# Patient Record
Sex: FEMALE | Race: WHITE | NOT HISPANIC OR LATINO | Employment: FULL TIME | URBAN - METROPOLITAN AREA
[De-identification: names, ages, dates, MRNs, and addresses within clinical notes are randomized per-mention and may not be internally consistent; named-entity substitution may affect disease eponyms.]

---

## 2017-01-23 ENCOUNTER — TELEPHONE (OUTPATIENT)
Dept: OBSTETRICS AND GYNECOLOGY | Facility: CLINIC | Age: 27
End: 2017-01-23

## 2017-01-23 NOTE — TELEPHONE ENCOUNTER
----- Message from Kait Acosta sent at 1/23/2017  9:22 AM CST -----  Contact: patient  Patient wants to change her US appt so that her  can be with her, the appointment with Ms Vergara is OK to stay the same. Please call patient back at [phone]. Thank you

## 2017-01-23 NOTE — TELEPHONE ENCOUNTER
----- Message from Makenna Aguilera sent at 1/23/2017  2:57 PM CST -----  Contact: pt  Pt is returning nurse Cindy call. Pls call pt back at [phone]

## 2017-02-02 ENCOUNTER — ROUTINE PRENATAL (OUTPATIENT)
Dept: OBSTETRICS AND GYNECOLOGY | Facility: CLINIC | Age: 27
End: 2017-02-02
Payer: COMMERCIAL

## 2017-02-02 ENCOUNTER — PROCEDURE VISIT (OUTPATIENT)
Dept: OBSTETRICS AND GYNECOLOGY | Facility: CLINIC | Age: 27
End: 2017-02-02
Payer: COMMERCIAL

## 2017-02-02 VITALS
SYSTOLIC BLOOD PRESSURE: 116 MMHG | DIASTOLIC BLOOD PRESSURE: 72 MMHG | BODY MASS INDEX: 31.84 KG/M2 | WEIGHT: 191.38 LBS

## 2017-02-02 DIAGNOSIS — Z36.3 ENCOUNTER FOR ROUTINE SCREENING FOR MALFORMATION USING ULTRASONICS: ICD-10-CM

## 2017-02-02 DIAGNOSIS — Z34.92 ENCOUNTER FOR SUPERVISION OF NORMAL PREGNANCY IN SECOND TRIMESTER, UNSPECIFIED GRAVIDITY: Primary | ICD-10-CM

## 2017-02-02 PROCEDURE — 99999 PR PBB SHADOW E&M-EST. PATIENT-LVL II: CPT | Mod: PBBFAC,,, | Performed by: ADVANCED PRACTICE MIDWIFE

## 2017-02-02 PROCEDURE — 0502F SUBSEQUENT PRENATAL CARE: CPT | Mod: S$GLB,,, | Performed by: ADVANCED PRACTICE MIDWIFE

## 2017-02-02 PROCEDURE — 76805 OB US >/= 14 WKS SNGL FETUS: CPT | Mod: S$GLB,,, | Performed by: ADVANCED PRACTICE MIDWIFE

## 2017-02-02 NOTE — MR AVS SNAPSHOT
[facility]  [address]  [place] [address]  Phone: [phone]  Fax: [phone]                  Liane Valiente   2017 2:45 PM   Routine Prenatal    Description:  Female : 1990   Provider:  Sintia Vergara CNM   Department:  [facility]           Diagnoses this Visit        Comments    Encounter for supervision of normal pregnancy in second trimester, unspecified     -  Primary            To Do List           Future Appointments        Provider Department Dept Phone    2017 2:45 PM JUSTINE Izaguirre OB/ -[phone]    3/2/2017 10:00 AM JUSTINE Izaguirre OB/ KYLE [phone]      Goals (5 Years of Data)     None      [facility]     [facility] On [facility] Nurse Care Line -  Assistance  Registered nurses in the [facility] On Call Center provide clinical advisement, health education, appointment booking, and other advisory services.  Call for this free service at [phone].             Medications           Message regarding Medications     Verify the changes and/or additions to your medication regime listed below are the same as discussed with your clinician today.  If any of these changes or additions are incorrect, please notify your healthcare provider.             Verify that the below list of medications is an accurate representation of the medications you are currently taking.  If none reported, the list may be blank. If incorrect, please contact your healthcare provider. Carry this list with you in case of emergency.           Current Medications     prenatal vitamin #45-iron-FA 28 mg iron- 1 mg Chew Take by mouth.           Clinical Reference Information           Prenatal Vitals     Enc. Date GA Prenatal Vitals Prenatal Pulse Pain Level Urine Albumin/Glucose Edema Presentation Dilation/Effacement/Station    17 20w1d 116/72 / 86.8 kg (191 lb 5.8 oz)  / us 153 / Present  0 Negative / Negative  None      16 13w6d 110/70 / 83 kg (182 lb 15.7 oz)  / 151   Negative / Negative       16 8w5d 110/68 / 82.1 kg (181 lb)  / u/s167   Negative / Negative          TW.7 kg (10 lb 5.8 oz)   Pregravid weight: 82.1 kg (181 lb)       Your Vitals Were     BP Weight Last Period BMI       116/72 86.8 kg (191 lb 5.8 oz) 2016 31.84 kg/m2       Allergies as of 2017     Sulfa (Sulfonamide Antibiotics)    Pear      Immunizations Administered on Date of Encounter - 2017     None      Language Assistance Services     ATTENTION: Language assistance services are available, free of charge. Please call [phone].      ATENCIÓN: Si habla millie, tiene a gill disposición servicios gratuitos de asistencia lingüística. Llame al [phone].     CHÚ Ý: N?u b?n nói Ti?ng Vi?t, có các d?ch v? h? tr? ngôn ng? mi?n phí dành cho b?n. G?i s? [phone].         [facility] complies with applicable Federal civil rights laws and does not discriminate on the basis of race, color, national origin, age, disability, or sex.

## 2017-03-02 ENCOUNTER — ROUTINE PRENATAL (OUTPATIENT)
Dept: OBSTETRICS AND GYNECOLOGY | Facility: CLINIC | Age: 27
End: 2017-03-02
Payer: COMMERCIAL

## 2017-03-02 VITALS
WEIGHT: 200.19 LBS | BODY MASS INDEX: 33.31 KG/M2 | SYSTOLIC BLOOD PRESSURE: 115 MMHG | DIASTOLIC BLOOD PRESSURE: 62 MMHG

## 2017-03-02 DIAGNOSIS — Z34.92 ENCOUNTER FOR SUPERVISION OF NORMAL PREGNANCY IN SECOND TRIMESTER, UNSPECIFIED GRAVIDITY: Primary | ICD-10-CM

## 2017-03-02 PROCEDURE — 0502F SUBSEQUENT PRENATAL CARE: CPT | Mod: S$GLB,,, | Performed by: ADVANCED PRACTICE MIDWIFE

## 2017-03-02 PROCEDURE — 99999 PR PBB SHADOW E&M-EST. PATIENT-LVL II: CPT | Mod: PBBFAC,,, | Performed by: ADVANCED PRACTICE MIDWIFE

## 2017-03-02 NOTE — MR AVS SNAPSHOT
[facility]  [address]  [place] [address]  Phone: [phone]  Fax: [phone]                  Liane Valiente   3/2/2017 10:00 AM   Routine Prenatal    Description:  Female : 1990   Provider:  Sintia Vergara CNM   Department:  [facility]           Reason for Visit     Routine Prenatal Visit                To Do List           Future Appointments        Provider Department Dept Phone    3/20/2017 8:00 AM Sintia Vergara CNM [facility] [phone]      Goals (5 Years of Data)     None      [facility]     [facility] Call Nurse [facility] Line -  Assistance  Registered nurses in the [facility] On Call Center provide clinical advisement, health education, appointment booking, and other advisory services.  Call for this free service at [phone].             Medications           Message regarding Medications     Verify the changes and/or additions to your medication regime listed below are the same as discussed with your clinician today.  If any of these changes or additions are incorrect, please notify your healthcare provider.             Verify that the below list of medications is an accurate representation of the medications you are currently taking.  If none reported, the list may be blank. If incorrect, please contact your healthcare provider. Carry this list with you in case of emergency.           Current Medications     prenatal vitamin #45-iron-FA 28 mg iron- 1 mg Chew Take by mouth.           Clinical Reference Information           Prenatal Vitals     Enc. Date GA Prenatal Vitals Prenatal Pulse Pain Level Urine Albumin/Glucose Edema Presentation Dilation/Effacement/Station    3/2/17 24w1d 115/62 / 90.8 kg (200 lb 2.8 oz)  / 154 / Present  0        17 20w1d 116/72 / 86.8 kg (191 lb 5.8 oz)  / us 153 / Present  0 Negative / Negative None      16 13w6d 110/70 / 83 kg (182 lb 15.7 oz)  / 151   Negative / Negative        16 8w5d 110/68 / 82.1 kg (181 lb)  / u/s167   Negative / Negative          TW.7 kg (19 lb 2.9 oz)   Pregravid weight: 82.1 kg (181 lb)       Your Vitals Were     BP                   115/62           Allergies as of 3/2/2017     Sulfa (Sulfonamide Antibiotics)    Pear      Immunizations Administered on Date of Encounter - 3/2/2017     None      Language Assistance Services     ATTENTION: Language assistance services are available, free of charge. Please call [phone].      ATENCIÓN: Si habla español, tiene a gill disposición servicios gratuitos de asistencia lingüística. Llame al [phone].     CHÚ Ý: N?u b?n nói Ti?ng Vi?t, có các d?ch v? h? tr? ngôn ng? mi?n phí dành cho b?n. G?i s? [phone].         [facility] complies with applicable Federal civil rights laws and does not discriminate on the basis of race, color, national origin, age, disability, or sex.

## 2017-03-02 NOTE — PROGRESS NOTES
Feeling well. 28 week labs next visit. PTL talk.    Coffective counseling sheet Keep Baby Close discussed with mother. Reinforced rooming in practices, continued skin to skin, and quiet hours as requested by mother.  Encouraged mother to download Coffective mobile kaushal if she has not already done so. Mother verbalizes understanding.

## 2017-03-17 ENCOUNTER — PATIENT MESSAGE (OUTPATIENT)
Dept: OBSTETRICS AND GYNECOLOGY | Facility: CLINIC | Age: 27
End: 2017-03-17

## 2017-03-20 ENCOUNTER — LAB VISIT (OUTPATIENT)
Dept: LAB | Facility: HOSPITAL | Age: 27
End: 2017-03-20
Attending: NURSE PRACTITIONER
Payer: COMMERCIAL

## 2017-03-20 ENCOUNTER — ROUTINE PRENATAL (OUTPATIENT)
Dept: OBSTETRICS AND GYNECOLOGY | Facility: CLINIC | Age: 27
End: 2017-03-20
Payer: COMMERCIAL

## 2017-03-20 VITALS
SYSTOLIC BLOOD PRESSURE: 108 MMHG | DIASTOLIC BLOOD PRESSURE: 70 MMHG | WEIGHT: 199.94 LBS | BODY MASS INDEX: 33.27 KG/M2

## 2017-03-20 DIAGNOSIS — Z34.92 ENCOUNTER FOR SUPERVISION OF NORMAL PREGNANCY IN SECOND TRIMESTER, UNSPECIFIED GRAVIDITY: Primary | ICD-10-CM

## 2017-03-20 DIAGNOSIS — Z34.92 ENCOUNTER FOR SUPERVISION OF NORMAL PREGNANCY IN SECOND TRIMESTER, UNSPECIFIED GRAVIDITY: ICD-10-CM

## 2017-03-20 LAB
BASOPHILS # BLD AUTO: 0.02 K/UL
BASOPHILS NFR BLD: 0.2 %
DIFFERENTIAL METHOD: ABNORMAL
EOSINOPHIL # BLD AUTO: 0.1 K/UL
EOSINOPHIL NFR BLD: 0.6 %
ERYTHROCYTE [DISTWIDTH] IN BLOOD BY AUTOMATED COUNT: 12.8 %
GLUCOSE SERPL-MCNC: 105 MG/DL
HCT VFR BLD AUTO: 34.7 %
HGB BLD-MCNC: 12 G/DL
LYMPHOCYTES # BLD AUTO: 1.8 K/UL
LYMPHOCYTES NFR BLD: 20.3 %
MCH RBC QN AUTO: 31.3 PG
MCHC RBC AUTO-ENTMCNC: 34.6 %
MCV RBC AUTO: 90 FL
MONOCYTES # BLD AUTO: 0.6 K/UL
MONOCYTES NFR BLD: 6.1 %
NEUTROPHILS # BLD AUTO: 6.5 K/UL
NEUTROPHILS NFR BLD: 72.4 %
PLATELET # BLD AUTO: 224 K/UL
PMV BLD AUTO: 9.8 FL
RBC # BLD AUTO: 3.84 M/UL
WBC # BLD AUTO: 9 K/UL

## 2017-03-20 PROCEDURE — 99999 PR PBB SHADOW E&M-EST. PATIENT-LVL II: CPT | Mod: PBBFAC,,, | Performed by: ADVANCED PRACTICE MIDWIFE

## 2017-03-20 PROCEDURE — 0502F SUBSEQUENT PRENATAL CARE: CPT | Mod: S$GLB,,, | Performed by: ADVANCED PRACTICE MIDWIFE

## 2017-03-21 ENCOUNTER — PATIENT MESSAGE (OUTPATIENT)
Dept: OBSTETRICS AND GYNECOLOGY | Facility: CLINIC | Age: 27
End: 2017-03-21

## 2017-03-21 LAB
HIV 1+2 AB+HIV1 P24 AG SERPL QL IA: NEGATIVE
RPR SER QL: NORMAL

## 2017-04-11 ENCOUNTER — ROUTINE PRENATAL (OUTPATIENT)
Dept: OBSTETRICS AND GYNECOLOGY | Facility: CLINIC | Age: 27
End: 2017-04-11
Payer: COMMERCIAL

## 2017-04-11 VITALS — BODY MASS INDEX: 34.5 KG/M2 | WEIGHT: 207.31 LBS | SYSTOLIC BLOOD PRESSURE: 110 MMHG | DIASTOLIC BLOOD PRESSURE: 64 MMHG

## 2017-04-11 DIAGNOSIS — Z34.93 ENCOUNTER FOR SUPERVISION OF NORMAL PREGNANCY IN THIRD TRIMESTER, UNSPECIFIED GRAVIDITY: Primary | ICD-10-CM

## 2017-04-11 PROCEDURE — 99999 PR PBB SHADOW E&M-EST. PATIENT-LVL II: CPT | Mod: PBBFAC,,, | Performed by: ADVANCED PRACTICE MIDWIFE

## 2017-04-11 PROCEDURE — 0502F SUBSEQUENT PRENATAL CARE: CPT | Mod: S$GLB,,, | Performed by: ADVANCED PRACTICE MIDWIFE

## 2017-04-11 NOTE — PROGRESS NOTES
Feeling well. Few uc's, nothing regular. PTL talk. bg    Coffective Motivation Document discussed with mother. Reinforced benefits of breastfeeding, risk of formula, and basic principles for skin to skin, rooming in, cue based feedings and importance of effective latch. Encouraged mother to download Coffective mobile kaushal if she has not already done so. Mother verbalizes understanding.      Coffective counseling sheet Nourish discussed with mother. Reinforced basic breastfeeding position and latch as well as proper hand expression technique. Encouraged mother to download Coffective mobile kaushal if she has not already done so.  Mother verbalizes understanding.

## 2017-04-25 ENCOUNTER — ROUTINE PRENATAL (OUTPATIENT)
Dept: OBSTETRICS AND GYNECOLOGY | Facility: CLINIC | Age: 27
End: 2017-04-25
Payer: COMMERCIAL

## 2017-04-25 VITALS
BODY MASS INDEX: 35.04 KG/M2 | WEIGHT: 210.56 LBS | SYSTOLIC BLOOD PRESSURE: 122 MMHG | DIASTOLIC BLOOD PRESSURE: 80 MMHG

## 2017-04-25 DIAGNOSIS — Z34.93 ENCOUNTER FOR SUPERVISION OF NORMAL PREGNANCY IN THIRD TRIMESTER, UNSPECIFIED GRAVIDITY: Primary | ICD-10-CM

## 2017-04-25 PROCEDURE — 0502F SUBSEQUENT PRENATAL CARE: CPT | Mod: S$GLB,,, | Performed by: ADVANCED PRACTICE MIDWIFE

## 2017-04-25 PROCEDURE — 99999 PR PBB SHADOW E&M-EST. PATIENT-LVL II: CPT | Mod: PBBFAC,,, | Performed by: ADVANCED PRACTICE MIDWIFE

## 2017-04-25 NOTE — PROGRESS NOTES
Doing well. Diastolic bp slightly elevated. No PIH complaints. Trace protein in urine, precautions provided. bg    Coffective counseling sheet Nourish discussed with mother. Reinforced basic breastfeeding position and latch as well as proper hand expression technique. Encouraged mother to download Coffective mobile kaushal if she has not already done so.  Mother verbalizes understanding.

## 2017-04-25 NOTE — MR AVS SNAPSHOT
[facility]  [address]  [place] [address]  Phone: [phone]  Fax: [phone]                  Liane Valiente   2017 7:30 AM   Routine Prenatal    Description:  Female : 1990   Provider:  Sintia Vergara CNM   Department:  [facility]           Reason for Visit     Routine Prenatal Visit                To Do List           Future Appointments        Provider Department Dept Phone    2017 10:15 AM JUSTINE Izaguirre [phone]      Goals (5 Years of Data)     None      [facility]     [facility] On Call Nurse Care Line -  Assistance  Unless otherwise directed by your provider, please contact [facility], our nurse care line that is available for  assistance.     Registered nurses in the [facility] Center provide: appointment scheduling, clinical advisement, health education, and other advisory services.  Call: [phone] (toll free)               Medications           Message regarding Medications     Verify the changes and/or additions to your medication regime listed below are the same as discussed with your clinician today.  If any of these changes or additions are incorrect, please notify your healthcare provider.             Verify that the below list of medications is an accurate representation of the medications you are currently taking.  If none reported, the list may be blank. If incorrect, please contact your healthcare provider. Carry this list with you in case of emergency.           Current Medications     prenatal vitamin #45-iron-FA 28 mg iron- 1 mg Chew Take by mouth.           Clinical Reference Information           Prenatal Vitals     Enc. Date GA Prenatal Vitals Prenatal Pulse Pain Level Urine Albumin/Glucose Edema Presentation Dilation/Effacement/Station    17 31w6d 122/80 / 95.5 kg (210 lb 8.6 oz)  / 140 / Present  0        17 29w6d 110/64 / 94 kg (207 lb  5.5 oz)  / 151 / Present  0  None / None / None / No      3/20/17 26w5d 108/70 / 90.7 kg (199 lb 15.3 oz)  / 152 / Present  0 Negative / Negative None      3/2/17 24w1d 115/62 / 90.8 kg (200 lb 2.8 oz)  / 154 / Present  0 Negative / Negative None / None      17 20w1d 116/72 / 86.8 kg (191 lb 5.8 oz)  / us 153 / Present  0 Negative / Negative None      16 13w6d 110/70 / 83 kg (182 lb 15.7 oz)  / 151   Negative / Negative       16 8w5d 110/68 / 82.1 kg (181 lb)  / u/s167   Negative / Negative          TW.4 kg (29 lb 8.7 oz)   Pregravid weight: 82.1 kg (181 lb)       Your Vitals Were     BP Weight Last Period BMI       122/80 95.5 kg (210 lb 8.6 oz) 2016 35.04 kg/m2       Allergies as of 2017     Sulfa (Sulfonamide Antibiotics)    Pear      Immunizations Administered on Date of Encounter - 2017     None      Language Assistance Services     ATTENTION: Language assistance services are available, free of charge. Please call [phone].      ATENCIÓN: Si habla millie, tiene a gill disposición servicios gratuitos de asistencia lingüística. Llame al [phone].     MARISOL Ý: N?u b?n nói Ti?ng Vi?t, có các d?ch v? h? tr? ngôn ng? mi?n phí dành cho b?n. G?i s? [phone].         [facility] complies with applicable Federal civil rights laws and does not discriminate on the basis of race, color, national origin, age, disability, or sex.

## 2017-05-09 ENCOUNTER — ROUTINE PRENATAL (OUTPATIENT)
Dept: OBSTETRICS AND GYNECOLOGY | Facility: CLINIC | Age: 27
End: 2017-05-09
Payer: COMMERCIAL

## 2017-05-09 VITALS — BODY MASS INDEX: 35.6 KG/M2 | DIASTOLIC BLOOD PRESSURE: 74 MMHG | WEIGHT: 213.94 LBS | SYSTOLIC BLOOD PRESSURE: 124 MMHG

## 2017-05-09 DIAGNOSIS — Z34.93 ENCOUNTER FOR SUPERVISION OF NORMAL PREGNANCY IN THIRD TRIMESTER, UNSPECIFIED GRAVIDITY: Primary | ICD-10-CM

## 2017-05-09 PROCEDURE — 99999 PR PBB SHADOW E&M-EST. PATIENT-LVL II: CPT | Mod: PBBFAC,,, | Performed by: ADVANCED PRACTICE MIDWIFE

## 2017-05-09 PROCEDURE — 0502F SUBSEQUENT PRENATAL CARE: CPT | Mod: S$GLB,,, | Performed by: ADVANCED PRACTICE MIDWIFE

## 2017-05-09 NOTE — MR AVS SNAPSHOT
[facility]  [address]  [place] [address]  Phone: [phone]  Fax: [phone]                  Liane Valiente   2017 10:15 AM   Routine Prenatal    Description:  Female : 1990   Provider:  Sintia Vergara CNM   Department:  [facility]           Reason for Visit     Routine Prenatal Visit           Diagnoses this Visit        Comments    Encounter for supervision of normal pregnancy in third trimester, unspecified     -  Primary            To Do List           Future Appointments        Provider Department Dept Phone    2017 7:45 AM Sintia Vergara CNM [facility]/ -[phone]      Goals (5 Years of Data)     None      [facility]     [facility] On Call Nurse Care Line -  Assistance  Unless otherwise directed by your provider, please contact [facility], our nurse care line that is available for  assistance.     Registered nurses in the [facility] Center provide: appointment scheduling, clinical advisement, health education, and other advisory services.  Call: [phone] (toll free)               Medications           Message regarding Medications     Verify the changes and/or additions to your medication regime listed below are the same as discussed with your clinician today.  If any of these changes or additions are incorrect, please notify your healthcare provider.             Verify that the below list of medications is an accurate representation of the medications you are currently taking.  If none reported, the list may be blank. If incorrect, please contact your healthcare provider. Carry this list with you in case of emergency.           Current Medications     prenatal vitamin #45-iron-FA 28 mg iron- 1 mg Chew Take by mouth.           Clinical Reference Information           Prenatal Vitals     Enc. Date GA Prenatal Vitals Prenatal Pulse Pain Level Urine Albumin/Glucose Edema Presentation  Dilation/Effacement/Station    17 33w6d 124/74 / 97 kg (213 lb 15.3 oz)  / 143 / Present  0  None / None / None / No      17 31w6d 122/80 / 95.5 kg (210 lb 8.6 oz)  / 140 / Present  0        17 29w6d 110/64 / 94 kg (207 lb 5.5 oz)  / 151 / Present  0  None / None / None / No      3/20/17 26w5d 108/70 / 90.7 kg (199 lb 15.3 oz)  / 152 / Present  0 Negative / Negative None      3/2/17 24w1d 115/62 / 90.8 kg (200 lb 2.8 oz)  / 154 / Present  0 Negative / Negative None / None      17 20w1d 116/72 / 86.8 kg (191 lb 5.8 oz)  / us 153 / Present  0 Negative / Negative None      16 13w6d 110/70 / 83 kg (182 lb 15.7 oz)  / 151   Negative / Negative       16 8w5d 110/68 / 82.1 kg (181 lb)  / u/s167   Negative / Negative          TW.9 kg (32 lb 15.3 oz)   Pregravid weight: 82.1 kg (181 lb)       Your Vitals Were     BP Weight Last Period BMI       124/74 97 kg (213 lb 15.3 oz) 2016 35.6 kg/m2       Allergies as of 2017     Sulfa (Sulfonamide Antibiotics)    Pear      Immunizations Administered on Date of Encounter - 2017     None      Language Assistance Services     ATTENTION: Language assistance services are available, free of charge. Please call [phone].      ATENCIÓN: Si habla español, tiene a gill disposición servicios gratuitos de asistencia lingüística. Llame al [phone].     MARISOL Ý: N?u b?n nói Ti?ng Vi?t, có các d?ch v? h? tr? ngôn ng? mi?n phí dành cho b?n. G?i s? [phone].         [facility] complies with applicable Federal civil rights laws and does not discriminate on the basis of race, color, national origin, age, disability, or sex.

## 2017-05-09 NOTE — PROGRESS NOTES
Doing well. PTL talk. No complaints. GBS next visit.  bg    Coffective counseling sheet Protect Breastfeeding discussed with mother. Reinforced avoidence of artifical nipples and formula, unless medically indicated.  Encouraged mother to download Coffective mobile kaushal if she has not already done so. Mother verbalizes understanding.

## 2017-05-26 ENCOUNTER — ROUTINE PRENATAL (OUTPATIENT)
Dept: OBSTETRICS AND GYNECOLOGY | Facility: CLINIC | Age: 27
End: 2017-05-26
Payer: COMMERCIAL

## 2017-05-26 VITALS — SYSTOLIC BLOOD PRESSURE: 110 MMHG | DIASTOLIC BLOOD PRESSURE: 64 MMHG | BODY MASS INDEX: 36.61 KG/M2 | WEIGHT: 220 LBS

## 2017-05-26 DIAGNOSIS — Z34.93 ENCOUNTER FOR SUPERVISION OF NORMAL PREGNANCY IN THIRD TRIMESTER, UNSPECIFIED GRAVIDITY: Primary | ICD-10-CM

## 2017-05-26 PROCEDURE — 87081 CULTURE SCREEN ONLY: CPT

## 2017-05-26 PROCEDURE — 0502F SUBSEQUENT PRENATAL CARE: CPT | Mod: S$GLB,,, | Performed by: ADVANCED PRACTICE MIDWIFE

## 2017-05-26 PROCEDURE — 99999 PR PBB SHADOW E&M-EST. PATIENT-LVL II: CPT | Mod: PBBFAC,,, | Performed by: ADVANCED PRACTICE MIDWIFE

## 2017-05-29 LAB — BACTERIA SPEC AEROBE CULT: NORMAL

## 2017-06-02 ENCOUNTER — ROUTINE PRENATAL (OUTPATIENT)
Dept: OBSTETRICS AND GYNECOLOGY | Facility: CLINIC | Age: 27
End: 2017-06-02
Payer: COMMERCIAL

## 2017-06-02 VITALS
WEIGHT: 223.31 LBS | SYSTOLIC BLOOD PRESSURE: 110 MMHG | DIASTOLIC BLOOD PRESSURE: 64 MMHG | BODY MASS INDEX: 37.16 KG/M2

## 2017-06-02 DIAGNOSIS — Z34.93 ENCOUNTER FOR SUPERVISION OF NORMAL PREGNANCY IN THIRD TRIMESTER, UNSPECIFIED GRAVIDITY: Primary | ICD-10-CM

## 2017-06-02 PROCEDURE — 99999 PR PBB SHADOW E&M-EST. PATIENT-LVL II: CPT | Mod: PBBFAC,,, | Performed by: ADVANCED PRACTICE MIDWIFE

## 2017-06-02 PROCEDURE — 0502F SUBSEQUENT PRENATAL CARE: CPT | Mod: S$GLB,,, | Performed by: ADVANCED PRACTICE MIDWIFE

## 2017-06-02 NOTE — PROGRESS NOTES
Doing well. GBS negative. Plans natural birth. Labor talk, kick counts. bg    Coffective counseling sheet Fall In Love discussed with mother. Reinforced immediate skin to skin, the magic first hour, importance of the first feeding and delaying routine procedures. Encouraged mother to download Coffective mobile kaushal if she has not already done so. Mother verbalizes understanding.  Coffective counseling sheet Get Ready discussed with mother. Reinforced avoiding induction of labor unless medically indicated as well as comfort measures during labor.  Encouraged mother to download Coffective mobile kaushal if she has not already done so. Mother verbalizes understanding.

## 2017-06-09 ENCOUNTER — ROUTINE PRENATAL (OUTPATIENT)
Dept: OBSTETRICS AND GYNECOLOGY | Facility: CLINIC | Age: 27
End: 2017-06-09
Payer: COMMERCIAL

## 2017-06-09 VITALS
DIASTOLIC BLOOD PRESSURE: 80 MMHG | BODY MASS INDEX: 37.02 KG/M2 | WEIGHT: 222.44 LBS | SYSTOLIC BLOOD PRESSURE: 128 MMHG

## 2017-06-09 DIAGNOSIS — Z34.93 ENCOUNTER FOR SUPERVISION OF NORMAL PREGNANCY IN THIRD TRIMESTER, UNSPECIFIED GRAVIDITY: Primary | ICD-10-CM

## 2017-06-09 PROCEDURE — 99999 PR PBB SHADOW E&M-EST. PATIENT-LVL II: CPT | Mod: PBBFAC,,, | Performed by: ADVANCED PRACTICE MIDWIFE

## 2017-06-09 PROCEDURE — 99213 OFFICE O/P EST LOW 20 MIN: CPT | Mod: S$GLB,,, | Performed by: ADVANCED PRACTICE MIDWIFE

## 2017-06-12 ENCOUNTER — PATIENT MESSAGE (OUTPATIENT)
Dept: OBSTETRICS AND GYNECOLOGY | Facility: CLINIC | Age: 27
End: 2017-06-12

## 2017-06-13 ENCOUNTER — ROUTINE PRENATAL (OUTPATIENT)
Dept: OBSTETRICS AND GYNECOLOGY | Facility: CLINIC | Age: 27
End: 2017-06-13
Payer: COMMERCIAL

## 2017-06-13 VITALS — SYSTOLIC BLOOD PRESSURE: 114 MMHG | BODY MASS INDEX: 37.2 KG/M2 | DIASTOLIC BLOOD PRESSURE: 72 MMHG | WEIGHT: 223.56 LBS

## 2017-06-13 DIAGNOSIS — Z34.93 ENCOUNTER FOR SUPERVISION OF NORMAL PREGNANCY IN THIRD TRIMESTER, UNSPECIFIED GRAVIDITY: Primary | ICD-10-CM

## 2017-06-13 PROCEDURE — 0502F SUBSEQUENT PRENATAL CARE: CPT | Mod: S$GLB,,, | Performed by: ADVANCED PRACTICE MIDWIFE

## 2017-06-13 PROCEDURE — 99999 PR PBB SHADOW E&M-EST. PATIENT-LVL II: CPT | Mod: PBBFAC,,, | Performed by: ADVANCED PRACTICE MIDWIFE

## 2017-06-20 ENCOUNTER — PROCEDURE VISIT (OUTPATIENT)
Dept: OBSTETRICS AND GYNECOLOGY | Facility: CLINIC | Age: 27
End: 2017-06-20
Payer: COMMERCIAL

## 2017-06-20 ENCOUNTER — ROUTINE PRENATAL (OUTPATIENT)
Dept: OBSTETRICS AND GYNECOLOGY | Facility: CLINIC | Age: 27
End: 2017-06-20
Payer: COMMERCIAL

## 2017-06-20 VITALS
SYSTOLIC BLOOD PRESSURE: 120 MMHG | DIASTOLIC BLOOD PRESSURE: 70 MMHG | BODY MASS INDEX: 37.75 KG/M2 | WEIGHT: 226.88 LBS

## 2017-06-20 DIAGNOSIS — Z3A.39 39 WEEKS GESTATION OF PREGNANCY: ICD-10-CM

## 2017-06-20 DIAGNOSIS — O26.843 UTERINE SIZE-DATE DISCREPANCY, THIRD TRIMESTER: ICD-10-CM

## 2017-06-20 DIAGNOSIS — O26.843 UTERINE SIZE-DATE DISCREPANCY, THIRD TRIMESTER: Primary | ICD-10-CM

## 2017-06-20 DIAGNOSIS — Z34.93 ENCOUNTER FOR SUPERVISION OF NORMAL PREGNANCY IN THIRD TRIMESTER, UNSPECIFIED GRAVIDITY: Primary | ICD-10-CM

## 2017-06-20 PROCEDURE — 99213 OFFICE O/P EST LOW 20 MIN: CPT | Mod: S$GLB,,, | Performed by: ADVANCED PRACTICE MIDWIFE

## 2017-06-20 PROCEDURE — 99999 PR PBB SHADOW E&M-EST. PATIENT-LVL II: CPT | Mod: PBBFAC,,, | Performed by: ADVANCED PRACTICE MIDWIFE

## 2017-06-20 PROCEDURE — 76819 FETAL BIOPHYS PROFIL W/O NST: CPT | Mod: S$GLB,,, | Performed by: OBSTETRICS & GYNECOLOGY

## 2017-06-20 NOTE — PROGRESS NOTES
Doing well. Ready for baby. Size greater than dates. U/s shows good growth. BPP 8/8. 3658 grams. Labor talk, kick counts. bg

## 2017-06-27 ENCOUNTER — ANESTHESIA EVENT (OUTPATIENT)
Dept: OBSTETRICS AND GYNECOLOGY | Facility: HOSPITAL | Age: 27
End: 2017-06-27
Payer: COMMERCIAL

## 2017-06-27 ENCOUNTER — ANESTHESIA (OUTPATIENT)
Dept: OBSTETRICS AND GYNECOLOGY | Facility: HOSPITAL | Age: 27
End: 2017-06-27
Payer: COMMERCIAL

## 2017-06-27 ENCOUNTER — HOSPITAL ENCOUNTER (INPATIENT)
Facility: HOSPITAL | Age: 27
LOS: 2 days | Discharge: HOME | End: 2017-06-29
Attending: OBSTETRICS & GYNECOLOGY | Admitting: OBSTETRICS & GYNECOLOGY
Payer: COMMERCIAL

## 2017-06-27 DIAGNOSIS — Z37.9 NORMAL LABOR: ICD-10-CM

## 2017-06-27 LAB
ABO + RH BLD: NORMAL
BASOPHILS # BLD AUTO: 0.01 K/UL
BASOPHILS NFR BLD: 0.1 %
BLD GP AB SCN CELLS X3 SERPL QL: NORMAL
DIFFERENTIAL METHOD: ABNORMAL
EOSINOPHIL # BLD AUTO: 0 K/UL
EOSINOPHIL NFR BLD: 0.1 %
ERYTHROCYTE [DISTWIDTH] IN BLOOD BY AUTOMATED COUNT: 13.6 %
HCT VFR BLD AUTO: 38.2 %
HGB BLD-MCNC: 13.3 G/DL
LYMPHOCYTES # BLD AUTO: 1.7 K/UL
LYMPHOCYTES NFR BLD: 9.6 %
MCH RBC QN AUTO: 30.8 PG
MCHC RBC AUTO-ENTMCNC: 34.8 %
MCV RBC AUTO: 88 FL
MONOCYTES # BLD AUTO: 0.9 K/UL
MONOCYTES NFR BLD: 5.2 %
NEUTROPHILS # BLD AUTO: 15.1 K/UL
NEUTROPHILS NFR BLD: 85 %
PLATELET # BLD AUTO: 202 K/UL
PMV BLD AUTO: 9.8 FL
RBC # BLD AUTO: 4.32 M/UL
WBC # BLD AUTO: 17.79 K/UL

## 2017-06-27 PROCEDURE — 62326 NJX INTERLAMINAR LMBR/SAC: CPT | Performed by: ANESTHESIOLOGY

## 2017-06-27 PROCEDURE — 25000003 PHARM REV CODE 250: Performed by: ADVANCED PRACTICE MIDWIFE

## 2017-06-27 PROCEDURE — 10907ZC DRAINAGE OF AMNIOTIC FLUID, THERAPEUTIC FROM PRODUCTS OF CONCEPTION, VIA NATURAL OR ARTIFICIAL OPENING: ICD-10-PCS | Performed by: ADVANCED PRACTICE MIDWIFE

## 2017-06-27 PROCEDURE — 85025 COMPLETE CBC W/AUTO DIFF WBC: CPT

## 2017-06-27 PROCEDURE — 11000001 HC ACUTE MED/SURG PRIVATE ROOM

## 2017-06-27 PROCEDURE — 63600175 PHARM REV CODE 636 W HCPCS: Performed by: NURSE ANESTHETIST, CERTIFIED REGISTERED

## 2017-06-27 PROCEDURE — 86850 RBC ANTIBODY SCREEN: CPT

## 2017-06-27 PROCEDURE — 86900 BLOOD TYPING SEROLOGIC ABO: CPT

## 2017-06-27 PROCEDURE — 27800517 HC TRAY,EPIDURAL-CONTINUOUS: Performed by: NURSE ANESTHETIST, CERTIFIED REGISTERED

## 2017-06-27 PROCEDURE — 25000003 PHARM REV CODE 250: Performed by: NURSE ANESTHETIST, CERTIFIED REGISTERED

## 2017-06-27 PROCEDURE — 72100002 HC LABOR CARE, 1ST 8 HOURS

## 2017-06-27 RX ORDER — LIDOCAINE HCL/EPINEPHRINE/PF 2%-1:200K
VIAL (ML) INJECTION
Status: DISCONTINUED | OUTPATIENT
Start: 2017-06-27 | End: 2017-06-27

## 2017-06-27 RX ORDER — LIDOCAINE HYDROCHLORIDE 20 MG/ML
JELLY TOPICAL
Status: DISCONTINUED | OUTPATIENT
Start: 2017-06-27 | End: 2017-06-28

## 2017-06-27 RX ORDER — ROPIVACAINE HYDROCHLORIDE 2 MG/ML
INJECTION, SOLUTION EPIDURAL; INFILTRATION; PERINEURAL
Status: DISCONTINUED | OUTPATIENT
Start: 2017-06-27 | End: 2017-06-27

## 2017-06-27 RX ORDER — OXYTOCIN/RINGER'S LACTATE 20/1000 ML
2 PLASTIC BAG, INJECTION (ML) INTRAVENOUS CONTINUOUS
Status: DISCONTINUED | OUTPATIENT
Start: 2017-06-27 | End: 2017-06-28

## 2017-06-27 RX ORDER — LIDOCAINE HYDROCHLORIDE AND EPINEPHRINE 15; 5 MG/ML; UG/ML
INJECTION, SOLUTION EPIDURAL
Status: DISCONTINUED | OUTPATIENT
Start: 2017-06-27 | End: 2017-06-27

## 2017-06-27 RX ORDER — SODIUM CHLORIDE, SODIUM LACTATE, POTASSIUM CHLORIDE, CALCIUM CHLORIDE 600; 310; 30; 20 MG/100ML; MG/100ML; MG/100ML; MG/100ML
INJECTION, SOLUTION INTRAVENOUS CONTINUOUS
Status: DISCONTINUED | OUTPATIENT
Start: 2017-06-27 | End: 2017-06-28

## 2017-06-27 RX ORDER — ONDANSETRON 8 MG/1
8 TABLET, ORALLY DISINTEGRATING ORAL EVERY 8 HOURS PRN
Status: DISCONTINUED | OUTPATIENT
Start: 2017-06-27 | End: 2017-06-28

## 2017-06-27 RX ORDER — ROPIVACAINE HYDROCHLORIDE 2 MG/ML
INJECTION, SOLUTION EPIDURAL; INFILTRATION; PERINEURAL CONTINUOUS PRN
Status: DISCONTINUED | OUTPATIENT
Start: 2017-06-27 | End: 2017-06-27

## 2017-06-27 RX ORDER — FENTANYL CITRATE 50 UG/ML
INJECTION, SOLUTION INTRAMUSCULAR; INTRAVENOUS
Status: DISCONTINUED | OUTPATIENT
Start: 2017-06-27 | End: 2017-06-27

## 2017-06-27 RX ORDER — MISOPROSTOL 200 UG/1
600 TABLET ORAL
Status: DISCONTINUED | OUTPATIENT
Start: 2017-06-27 | End: 2017-06-28

## 2017-06-27 RX ADMIN — LIDOCAINE HYDROCHLORIDE AND EPINEPHRINE 3 ML: 15; 5 INJECTION, SOLUTION EPIDURAL; INFILTRATION; INTRACAUDAL; PERINEURAL at 03:06

## 2017-06-27 RX ADMIN — SODIUM CHLORIDE, SODIUM LACTATE, POTASSIUM CHLORIDE, AND CALCIUM CHLORIDE: .6; .31; .03; .02 INJECTION, SOLUTION INTRAVENOUS at 07:06

## 2017-06-27 RX ADMIN — LIDOCAINE HYDROCHLORIDE,EPINEPHRINE BITARTRATE 4 ML: 20; .005 INJECTION, SOLUTION EPIDURAL; INFILTRATION; INTRACAUDAL; PERINEURAL at 03:06

## 2017-06-27 RX ADMIN — SODIUM CHLORIDE, SODIUM LACTATE, POTASSIUM CHLORIDE, AND CALCIUM CHLORIDE 1000 ML: .6; .31; .03; .02 INJECTION, SOLUTION INTRAVENOUS at 03:06

## 2017-06-27 RX ADMIN — SODIUM CHLORIDE, SODIUM LACTATE, POTASSIUM CHLORIDE, AND CALCIUM CHLORIDE: .6; .31; .03; .02 INJECTION, SOLUTION INTRAVENOUS at 03:06

## 2017-06-27 RX ADMIN — ROPIVACAINE HYDROCHLORIDE 14 ML/HR: 2 INJECTION, SOLUTION EPIDURAL; INFILTRATION at 03:06

## 2017-06-27 RX ADMIN — SODIUM CHLORIDE, SODIUM LACTATE, POTASSIUM CHLORIDE, AND CALCIUM CHLORIDE 1000 ML: .6; .31; .03; .02 INJECTION, SOLUTION INTRAVENOUS at 11:06

## 2017-06-27 RX ADMIN — FENTANYL CITRATE 100 MCG: 50 INJECTION, SOLUTION INTRAMUSCULAR; INTRAVENOUS at 03:06

## 2017-06-27 RX ADMIN — ROPIVACAINE HYDROCHLORIDE 5 ML: 2 INJECTION, SOLUTION EPIDURAL; INFILTRATION at 03:06

## 2017-06-27 RX ADMIN — ONDANSETRON 8 MG: 8 TABLET, ORALLY DISINTEGRATING ORAL at 07:06

## 2017-06-27 RX ADMIN — Medication 2 MILLI-UNITS/MIN: at 06:06

## 2017-06-27 NOTE — ASSESSMENT & PLAN NOTE
Protracted labor    Cat 2 FHT's   Epidural for pain management per her request  Reassess after comfortable

## 2017-06-27 NOTE — ANESTHESIA PROCEDURE NOTES
Epidural    Patient location during procedure: OB   Reason for block: primary anesthetic   Diagnosis: IUP with labor pain   Start time: 6/27/2017 3:27 PM  Timeout: 6/27/2017 3:25 PM  End time: 6/27/2017 3:38 PM  Staffing  Anesthesiologist: DONAL BOUDREAUX  Resident/CRNA: KYA GUEVARA  Performed: anesthesiologist   Preanesthetic Checklist  Completed: patient identified, pre-op evaluation, timeout performed, IV checked, risks and benefits discussed, monitors and equipment checked, anesthesia consent given, hand hygiene performed and patient being monitored  Preparation  Patient position: sitting  Prep: Betadine  Patient monitoring: Blood Pressure and Pulse Ox  Epidural  Skin Anesthetic: lidocaine 1%  Skin Wheal: 3 mL  Administration type: continuous  Approach: midline  Interspace: L3-4  Injection technique: JANET air and JANET saline  Needle and Epidural Catheter  Needle type: Tuohy   Needle gauge: 18  Needle length: 3.5 inches  Needle insertion depth: 6 cm  Catheter type: multi-orifice  Catheter size: 20 G  Catheter at skin depth: 15 cm  Test dose: 3 mL of lidocaine 1.5% with Epi 1-to-200,000  Additional Documentation: incremental injection, no signs/symptoms of IV or SA injection, negative aspiration for heme and CSF, no paresthesia on injection, no significant pain on injection and no significant complaints from patient  Needle localization: anatomical landmarks  Medications:  Bolus administered: 10 mL of 0.2% ropivacaine  Epinephrine added: none  Assessment  Upper dermatomal levels - Left: T8  Right: T8   Dermatomal levels determined by pinch or prick  Ease of block: easy  Patient's tolerance of the procedure: comfortable throughout block and no complaints

## 2017-06-27 NOTE — PROGRESS NOTES
[facility] -   Obstetrics  Labor Progress Note    Patient Name: Liane Valiente  MRN: [id number]  Admission Date: 2017  Hospital Length of Stay: 0 days  Attending Physician: Dede Powell MD  Primary Care Provider: Mandi England MD    Subjective:     Principal Problem:Normal labor    Hospital Course:  Admit for spontaneous active labor  NCB    Interval History:  Liane is a 26 y.o.  at 40w6d. She is exhausted. Desires Nitrous oxide for pain management     Objective:     Vital Signs (Most Recent):  Temp: 98 °F (36.7 °C) (17 1521)  Pulse: 88 (17 1405)  Resp: 18 (17 0900)  BP: (!) 136/92 (17 1402)  SpO2: 100 % (17 1405) Vital Signs (24h Range):  Temp:  [98 °F (36.7 °C)-98.2 °F (36.8 °C)] 98 °F (36.7 °C)  Pulse:  [71-99] 88  Resp:  [17-18] 18  SpO2:  [99 %-100 %] 100 %  BP: (107-140)/(56-92) 136/92     Weight: 99.8 kg (220 lb)  Body mass index is 37.76 kg/m².    FHT: 135 bpm; mod variability Cat 1 (reassuring)  TOCO:  Q 4-6 minutes    Cervical Exam:  Deferred      Significant Labs:  Lab Results   Component Value Date    GROUPTRH A POS 2017    HEPBSAG Negative 2016    STREPBCULT No Group B Streptococcus isolated 2017       I have personallly reviewed all pertinent lab results from the last 24 hours.    Physical Exam:   Constitutional: She is oriented to person, place, and time. She appears well-developed and well-nourished.    HENT:   Head: Normocephalic.      Cardiovascular: Intact distal pulses.   Pulse Score: 2+   Pulmonary/Chest: Effort normal. No respiratory distress.        Abdominal: Soft.     Genitourinary: Uterus normal.           Musculoskeletal: Moves all extremeties.       Neurological: She is alert and oriented to person, place, and time.    Skin: Skin is warm and dry.    Psychiatric: She has a normal mood and affect. Her behavior is normal.       Assessment/Plan:     26 y.o. female  at 40w6d for:    * Normal labor     Active labor   Nitrous oxide for pain management   Anticipate vaginal birth               Kaitlyn Lorenzana CNM  Obstetrics  [facility]

## 2017-06-27 NOTE — SUBJECTIVE & OBJECTIVE
Interval History:  Liane is a 26 y.o.  at 40w6d. She has used nitrous oxide and birth tub for pain management without much relief; requests epidural    Objective:     Vital Signs (Most Recent):  Temp: 98 °F (36.7 °C) (17 1521)  Pulse: 88 (17 1405)  Resp: 18 (17 0900)  BP: (!) 136/92 (17 1402)  SpO2: 100 % (17 1405) Vital Signs (24h Range):  Temp:  [98 °F (36.7 °C)-98.2 °F (36.8 °C)] 98 °F (36.7 °C)  Pulse:  [71-99] 88  Resp:  [17-18] 18  SpO2:  [99 %-100 %] 100 %  BP: (107-140)/(56-92) 136/92     Weight: 99.8 kg (220 lb)  Body mass index is 37.76 kg/m².    FHT: 135 bpm;mod variability; occasional variable decel Cat 2 (reassuring)  TOCO:  Q 2-4 minutes    Cervical Exam:  Dilation:  8  Effacement:  100%  Station: -2  Presentation: Vertex   AROM @ 1345, clear fluid      Significant Labs:  Lab Results   Component Value Date    GROUPTRH A POS 2017    HEPBSAG Negative 2016    STREPBCULT No Group B Streptococcus isolated 2017       I have personallly reviewed all pertinent lab results from the last 24 hours.    Physical Exam:   Constitutional: She is oriented to person, place, and time. She appears well-developed and well-nourished.        Pulmonary/Chest: Effort normal. No respiratory distress.          Genitourinary: Vagina normal and uterus normal.           Musculoskeletal: Moves all extremeties.       Neurological: She is alert and oriented to person, place, and time.    Skin: Skin is warm and dry.    Psychiatric: She has a normal mood and affect.

## 2017-06-27 NOTE — ASSESSMENT & PLAN NOTE
IUPC placed, no cervical change X 12 hours, cervix more edematous than earlier today and molding noted.  Start low dose pitocin per protocol

## 2017-06-27 NOTE — PROGRESS NOTES
[facility]  Obstetrics  Labor Progress Note    Patient Name: Liane Valiente  MRN: [id number]  Admission Date: 2017  Hospital Length of Stay: 0 days  Attending Physician: Dede Powell MD  Primary Care Provider: Mandi England MD    Subjective:     Principal Problem:Normal labor    Hospital Course:  Admit for spontaneous active labor  NCB    Interval History:  Liane is a 26 y.o.  at 40w6d. She is doing well, comfortable after epidural.     Objective:     Vital Signs (Most Recent):  Temp: 98 °F (36.7 °C) (17 1521)  Pulse: 88 (17 1405)  Resp: 18 (17 0900)  BP: (!) 136/92 (17 1402)  SpO2: 100 % (17 1405) Vital Signs (24h Range):  Temp:  [98 °F (36.7 °C)-98.2 °F (36.8 °C)] 98 °F (36.7 °C)  Pulse:  [71-99] 88  Resp:  [17-18] 18  SpO2:  [99 %-100 %] 100 %  BP: (107-140)/(56-92) 136/92     Weight: 99.8 kg (220 lb)  Body mass index is 37.76 kg/m².    FHT: 130 Cat 2 (reassuring)  TOCO: Q 2-3 minutes    Cervical Exam:  Dilation:  8  Effacement:  80 %  Station: -2  Presentation: Vertex     Significant Labs:  Lab Results   Component Value Date    GROUPTRH A POS 2017    HEPBSAG Negative 2016    STREPBCULT No Group B Streptococcus isolated 2017       I have personallly reviewed all pertinent lab results from the last 24 hours.    Physical Exam:   Constitutional: No distress.               Genitourinary: Vagina normal.                     Assessment/Plan:     26 y.o. female  at 40w6d for:    Labor abnormal    IUPC placed, no cervical change X 12 hours, cervix more edematous than earlier today and molding noted.  Start low dose pitocin per protocol        * Normal labor    Protracted labor                 Kaitlyn Lorenzana CNM  Obstetrics  [facility]

## 2017-06-27 NOTE — SUBJECTIVE & OBJECTIVE
Interval History:  Liane is a 26 y.o.  at 40w6d. She is doing ok, but having much more pain than she has had with her other children, considering AROM or epidural.    Objective:     Vital Signs (Most Recent):  Temp: 98.2 °F (36.8 °C) (17 0900)  Pulse: 96 (17 0900)  Resp: 18 (17 0900)  BP: 127/76 (17 0900) Vital Signs (24h Range):  Temp:  [98.2 °F (36.8 °C)] 98.2 °F (36.8 °C)  Pulse:  [96-99] 96  Resp:  [17-18] 18  BP: (127-140)/(76-86) 127/76     Weight: 99.8 kg (220 lb)  Body mass index is 37.76 kg/m².    FHT: 140 reassuring)  TOCO:  Q 2 minutes    Cervical Exam:  Dilation:  9  Effacement:  75%  Station: -1  Presentation: Vertex     Significant Labs:  Lab Results   Component Value Date    GROUPTRH A POS 2016    HEPBSAG Negative 2016    STREPBCULT No Group B Streptococcus isolated 2017       I have personallly reviewed all pertinent lab results from the last 24 hours.    Physical Exam:   Constitutional: She is oriented to person, place, and time. She appears well-developed and well-nourished.       Cardiovascular: Normal rate and regular rhythm.     Pulmonary/Chest: Effort normal and breath sounds normal.        Abdominal: Soft. Bowel sounds are normal.   gravid             Musculoskeletal: Normal range of motion and moves all extremeties.       Neurological: She is alert and oriented to person, place, and time.     Psychiatric: She has a normal mood and affect. Her behavior is normal.

## 2017-06-27 NOTE — PROGRESS NOTES
[facility] -   Obstetrics  Labor Progress Note    Patient Name: Liane Valiente  MRN: [id number]  Admission Date: 2017  Hospital Length of Stay: 0 days  Attending Physician: Dede Powell MD  Primary Care Provider: Mandi England MD    Subjective:     Principal Problem:Normal labor    Hospital Course:  Admit for spontaneous active labor  NCB    Interval History:  Liane is a 26 y.o.  at 40w6d. She is doing ok, but having much more pain than she has had with her other children, considering AROM or epidural.    Objective:     Vital Signs (Most Recent):  Temp: 98.2 °F (36.8 °C) (17 0900)  Pulse: 96 (17 0900)  Resp: 18 (17 0900)  BP: 127/76 (17 0900) Vital Signs (24h Range):  Temp:  [98.2 °F (36.8 °C)] 98.2 °F (36.8 °C)  Pulse:  [96-99] 96  Resp:  [17-18] 18  BP: (127-140)/(76-86) 127/76     Weight: 99.8 kg (220 lb)  Body mass index is 37.76 kg/m².    FHT: 140 reassuring)  TOCO:  Q 2 minutes    Cervical Exam:  Dilation:  9  Effacement:  75%  Station: -1  Presentation: Vertex     Significant Labs:  Lab Results   Component Value Date    GROUPTRH A POS 2016    HEPBSAG Negative 2016    STREPBCULT No Group B Streptococcus isolated 2017       I have personallly reviewed all pertinent lab results from the last 24 hours.    Physical Exam:   Constitutional: She is oriented to person, place, and time. She appears well-developed and well-nourished.       Cardiovascular: Normal rate and regular rhythm.     Pulmonary/Chest: Effort normal and breath sounds normal.        Abdominal: Soft. Bowel sounds are normal.   gravid             Musculoskeletal: Normal range of motion and moves all extremeties.       Neurological: She is alert and oriented to person, place, and time.     Psychiatric: She has a normal mood and affect. Her behavior is normal.       Assessment/Plan:     26 y.o. female  at 40w6d for:  Pt and her  considering AROM or epidural.  Discussed options with her, pt undecided, will check on her again in 20-30 minutes.    * Normal labor    Admit for management of spontaneous active labor   Desires NCB -- birth tub for pain management   Anticipate vaginal birth               Kaitlyn Lorenzana CNM  Obstetrics  [facility]

## 2017-06-27 NOTE — ASSESSMENT & PLAN NOTE
Admit for management of spontaneous active labor   Desires NCB -- birth tub for pain management   Anticipate vaginal birth

## 2017-06-27 NOTE — PROGRESS NOTES
[facility]  Obstetrics  Labor Progress Note    Patient Name: Liane Valiente  MRN: [id number]  Admission Date: 2017  Hospital Length of Stay: 0 days  Attending Physician: Dede Powell MD  Primary Care Provider: Mandi England MD    Subjective:     Principal Problem:Normal labor    Hospital Course:  Admit for spontaneous active labor  NCB    Interval History:  Liane is a 26 y.o.  at 40w6d. She has used nitrous oxide and birth tub for pain management without much relief; requests epidural    Objective:     Vital Signs (Most Recent):  Temp: 98 °F (36.7 °C) (17 1521)  Pulse: 88 (17 1405)  Resp: 18 (17 0900)  BP: (!) 136/92 (17 1402)  SpO2: 100 % (17 1405) Vital Signs (24h Range):  Temp:  [98 °F (36.7 °C)-98.2 °F (36.8 °C)] 98 °F (36.7 °C)  Pulse:  [71-99] 88  Resp:  [17-18] 18  SpO2:  [99 %-100 %] 100 %  BP: (107-140)/(56-92) 136/92     Weight: 99.8 kg (220 lb)  Body mass index is 37.76 kg/m².    FHT: 135 bpm;mod variability; occasional variable decel Cat 2 (reassuring)  TOCO:  Q 2-4 minutes    Cervical Exam:  Dilation:  8  Effacement:  100%  Station: -2  Presentation: Vertex   AROM @ 1345, clear fluid      Significant Labs:  Lab Results   Component Value Date    GROUPTRH A POS 2017    HEPBSAG Negative 2016    STREPBCULT No Group B Streptococcus isolated 2017       I have personallly reviewed all pertinent lab results from the last 24 hours.    Physical Exam:   Constitutional: She is oriented to person, place, and time. She appears well-developed and well-nourished.        Pulmonary/Chest: Effort normal. No respiratory distress.          Genitourinary: Vagina normal and uterus normal.           Musculoskeletal: Moves all extremeties.       Neurological: She is alert and oriented to person, place, and time.    Skin: Skin is warm and dry.    Psychiatric: She has a normal mood and affect.       Assessment/Plan:     26 y.o. female   at 40w6d for:    * Normal labor    Protracted labor    Cat 2 FHT's   Epidural for pain management per her request  Reassess after comfortable                 Kaitlyn Lorenzana CNM  Obstetrics  [facility]

## 2017-06-27 NOTE — ANESTHESIA PREPROCEDURE EVALUATION
06/27/2017  Liane Valiente is a 26 y.o., female.    Pre-op Assessment    I have reviewed the Patient Summary Reports.     I have reviewed the Nursing Notes.   I have reviewed the Medications.     Review of Systems  Anesthesia Hx:  No previous Anesthesia  Neg history of prior surgery. Denies Family Hx of Anesthesia complications.   Denies Personal Hx of Anesthesia complications.   Social:  Non-Smoker, No Alcohol Use    Hematology/Oncology:  Hematology Normal   Oncology Normal     EENT/Dental:EENT/Dental Normal   Cardiovascular:  Cardiovascular Normal     Pulmonary:  Pulmonary Normal    Renal/:  Renal/ Normal     Hepatic/GI:  Hepatic/GI Normal    Musculoskeletal:  Musculoskeletal Normal    Neurological:  Neurology Normal    Endocrine:  Endocrine Normal    Psych:  Psychiatric Normal           Physical Exam  General:  Well nourished    Airway/Jaw/Neck:  Airway Findings: Mouth Opening: Normal Tongue: Normal  General Airway Assessment: Adult  Mallampati: II  Improves to II with phonation.  TM Distance: Normal, at least 6 cm       Chest/Lungs:  Chest/Lungs Findings: Normal Respiratory Rate     Heart/Vascular:  Heart Findings: Rate: Normal        Mental Status:  Mental Status Findings:  Cooperative, Alert and Oriented         Anesthesia Plan  Type of Anesthesia, risks & benefits discussed:  Anesthesia Type:  epidural  Patient's Preference:   Intra-op Monitoring Plan: standard ASA monitors  Intra-op Monitoring Plan Comments:   Post Op Pain Control Plan:   Post Op Pain Control Plan Comments:   Induction:    Beta Blocker:  Patient is not currently on a Beta-Blocker (No further documentation required).       Informed Consent: Patient understands risks and agrees with Anesthesia plan.  Questions answered. Anesthesia consent signed with patient.  ASA Score: 2     Day of Surgery Review of History & Physical: I  have interviewed and examined the patient. I have reviewed the patient's H&P dated:        Anesthesia Plan Notes: OB consult for Nitrous Oxide administration.

## 2017-06-27 NOTE — NURSING
Nitrous oxide set up at this time by [facility] nurse anesthetist. Patient lying in  Bed, siderails up,  at bedside with primary nurse. Continuous pulse ox and VS regulated by primary nurse.

## 2017-06-27 NOTE — SUBJECTIVE & OBJECTIVE
Obstetric HPI:  Patient reports Date/time of onset: 17 0400, Frequency: Every 2-3 minutes, Duration: 60 seconds and Intensity: moderate contractions, active fetal movement, No vaginal bleeding , possible of loss of fluid. Hx of 2 previous NCB.     This pregnancy has been uncomplicated.    Obstetric History       T2      L2     SAB0   TAB0   Ectopic0   Multiple0   Live Births2       # Outcome Date GA Lbr Robert/2nd Weight Sex Delivery Anes PTL Lv   3 Current            2 Term 01/19/15 38w2d  3.35 kg (7 lb 6.2 oz) M Vag-Spont  N NATHANIEL      Complications: Nuchal cord      Apgar1:  6                Apgar5: 8   1 Term 13 39w6d  3.147 kg (6 lb 15 oz) F Vag-Spont   NATHANIEL      Name: Keegan        No past medical history on file.  No past surgical history on file.    PTA Medications   Medication Sig    prenatal vitamin #45-iron-FA 28 mg iron- 1 mg Chew Take by mouth.       Review of patient's allergies indicates:   Allergen Reactions    Pear Anaphylaxis and Swelling    Sulfa (sulfonamide antibiotics) Anaphylaxis     Can't breathe, rash  Can't breathe, rash        Family History     Problem Relation (Age of Onset)    ADD / ADHD Other    Heart disease Other    Lung cancer Maternal Grandfather    Pancreatic cancer Paternal Grandfather        Social History Main Topics    Smoking status: Never Smoker    Smokeless tobacco: Not on file    Alcohol use No      Comment: occasional    Drug use: No    Sexual activity: Yes     Partners: Male     Review of Systems   Respiratory: Negative.    Cardiovascular: Negative.    Gastrointestinal: Negative.    Genitourinary: Positive for vaginal discharge.   Neurological: Negative.       Objective:     Vital Signs (Most Recent):    Vital Signs (24h Range):           There is no height or weight on file to calculate BMI.    FHT: 135  bpmCat 1 (reassuring)  TOCO:  Q 2-3 minutes    Physical Exam:   Constitutional: She is oriented to person, place, and time. She appears  well-developed and well-nourished.       Cardiovascular: Normal rate and regular rhythm.   Pulse Score: 2+   Pulmonary/Chest: Effort normal and breath sounds normal. No respiratory distress.        Abdominal: Soft. Bowel sounds are normal.   gravid     Genitourinary: Vagina normal and uterus normal.           Musculoskeletal: Normal range of motion and moves all extremeties.       Neurological: She is alert and oriented to person, place, and time.    Skin: Skin is warm and dry.    Psychiatric: She has a normal mood and affect. Her behavior is normal.       Cervix:  Dilation:  7  Effacement:  100%  Station: -2  Presentation: Vertex   Mucous noted in vagina on speculum exam      Significant Labs:  Lab Results   Component Value Date    GROUPTRH A POS 11/07/2016    HEPBSAG Negative 11/07/2016    STREPBCULT No Group B Streptococcus isolated 05/26/2017       I have personallly reviewed all pertinent lab results from the last 24 hours.

## 2017-06-27 NOTE — H&P
[facility] -   Obstetrics  History & Physical    Patient Name: Liane Valiente  MRN: [id number]  Admission Date: 2017  Primary Care Provider: Mandi England MD    Subjective:     Principal Problem:Normal labor    History of Present Illness:  C/o ctx and possible LOF this morning     Obstetric HPI:  Patient reports Date/time of onset: 17 0400, Frequency: Every 2-3 minutes, Duration: 60 seconds and Intensity: moderate contractions, active fetal movement, No vaginal bleeding , possible of loss of fluid. Hx of 2 previous NCB.     This pregnancy has been uncomplicated.    Obstetric History       T2      L2     SAB0   TAB0   Ectopic0   Multiple0   Live Births2       # Outcome Date GA Lbr Robert/2nd Weight Sex Delivery Anes PTL Lv   3 Current            2 Term 01/19/15 38w2d  3.35 kg (7 lb 6.2 oz) M Vag-Spont  N NATHANIEL      Complications: Nuchal cord      Apgar1:  6                Apgar5: 8   1 Term 13 39w6d  3.147 kg (6 lb 15 oz) F Vag-Spont   NATHANIEL      Name: Keegan        No past medical history on file.  No past surgical history on file.    PTA Medications   Medication Sig    prenatal vitamin #45-iron-FA 28 mg iron- 1 mg Chew Take by mouth.       Review of patient's allergies indicates:   Allergen Reactions    Pear Anaphylaxis and Swelling    Sulfa (sulfonamide antibiotics) Anaphylaxis     Can't breathe, rash  Can't breathe, rash        Family History     Problem Relation (Age of Onset)    ADD / ADHD Other    Heart disease Other    Lung cancer Maternal Grandfather    Pancreatic cancer Paternal Grandfather        Social History Main Topics    Smoking status: Never Smoker    Smokeless tobacco: Not on file    Alcohol use No      Comment: occasional    Drug use: No    Sexual activity: Yes     Partners: Male     Review of Systems   Respiratory: Negative.    Cardiovascular: Negative.    Gastrointestinal: Negative.    Genitourinary: Positive for vaginal discharge.    Neurological: Negative.       Objective:     Vital Signs (Most Recent):    Vital Signs (24h Range):           There is no height or weight on file to calculate BMI.    FHT: 135  bpmCat 1 (reassuring)  TOCO:  Q 2-3 minutes\    Physical Exam:   Constitutional: She is oriented to person, place, and time. She appears well-developed and well-nourished.       Cardiovascular: Normal rate and regular rhythm.   Pulse Score: 2+   Pulmonary/Chest: Effort normal and breath sounds normal. No respiratory distress.        Abdominal: Soft. Bowel sounds are normal.   gravid     Genitourinary: Vagina normal and uterus normal.           Musculoskeletal: Normal range of motion and moves all extremeties.       Neurological: She is alert and oriented to person, place, and time.    Skin: Skin is warm and dry.    Psychiatric: She has a normal mood and affect. Her behavior is normal.       Cervix:  Dilation:  7  Effacement:  100%  Station: -2  Presentation: Vertex   Soft   Mucous noted in vagina on speculum exam      Significant Labs:  Lab Results   Component Value Date    GROUPTRH A POS 2016    HEPBSAG Negative 2016    STREPBCULT No Group B Streptococcus isolated 2017       I have personallly reviewed all pertinent lab results from the last 24 hours.    Assessment/Plan:     26 y.o. female  at 40w6d for:    * Normal labor    Admit for management of spontaneous active labor   Desires NCB -- birth tub for pain management   Anticipate vaginal birth             Kaitlyn Lorenzana CNM  Obstetrics  [facility]

## 2017-06-28 PROCEDURE — 25000003 PHARM REV CODE 250: Performed by: ADVANCED PRACTICE MIDWIFE

## 2017-06-28 PROCEDURE — 72100003 HC LABOR CARE, EA. ADDL. 8 HRS

## 2017-06-28 PROCEDURE — 72200005 HC VAGINAL DELIVERY LEVEL II

## 2017-06-28 PROCEDURE — 51701 INSERT BLADDER CATHETER: CPT

## 2017-06-28 PROCEDURE — 11000001 HC ACUTE MED/SURG PRIVATE ROOM

## 2017-06-28 RX ORDER — PRENATAL WITH FERROUS FUM AND FOLIC ACID 3080; 920; 120; 400; 22; 1.84; 3; 20; 10; 1; 12; 200; 27; 25; 2 [IU]/1; [IU]/1; MG/1; [IU]/1; MG/1; MG/1; MG/1; MG/1; MG/1; MG/1; UG/1; MG/1; MG/1; MG/1; MG/1
1 TABLET ORAL DAILY
Status: DISCONTINUED | OUTPATIENT
Start: 2017-06-28 | End: 2017-06-29 | Stop reason: HOSPADM

## 2017-06-28 RX ORDER — ACETAMINOPHEN 325 MG/1
650 TABLET ORAL EVERY 6 HOURS PRN
Status: DISCONTINUED | OUTPATIENT
Start: 2017-06-28 | End: 2017-06-29 | Stop reason: HOSPADM

## 2017-06-28 RX ORDER — DIPHENHYDRAMINE HYDROCHLORIDE 50 MG/ML
25 INJECTION INTRAMUSCULAR; INTRAVENOUS EVERY 4 HOURS PRN
Status: DISCONTINUED | OUTPATIENT
Start: 2017-06-28 | End: 2017-06-29 | Stop reason: HOSPADM

## 2017-06-28 RX ORDER — OXYTOCIN/RINGER'S LACTATE 20/1000 ML
41.65 PLASTIC BAG, INJECTION (ML) INTRAVENOUS CONTINUOUS
Status: ACTIVE | OUTPATIENT
Start: 2017-06-28 | End: 2017-06-28

## 2017-06-28 RX ORDER — OXYCODONE AND ACETAMINOPHEN 10; 325 MG/1; MG/1
1 TABLET ORAL EVERY 4 HOURS PRN
Status: DISCONTINUED | OUTPATIENT
Start: 2017-06-28 | End: 2017-06-29 | Stop reason: HOSPADM

## 2017-06-28 RX ORDER — OXYCODONE AND ACETAMINOPHEN 5; 325 MG/1; MG/1
1 TABLET ORAL EVERY 4 HOURS PRN
Status: DISCONTINUED | OUTPATIENT
Start: 2017-06-28 | End: 2017-06-29 | Stop reason: HOSPADM

## 2017-06-28 RX ORDER — IBUPROFEN 600 MG/1
600 TABLET ORAL EVERY 6 HOURS
Status: DISCONTINUED | OUTPATIENT
Start: 2017-06-28 | End: 2017-06-29 | Stop reason: HOSPADM

## 2017-06-28 RX ORDER — HYDROCORTISONE 25 MG/G
CREAM TOPICAL 3 TIMES DAILY PRN
Status: DISCONTINUED | OUTPATIENT
Start: 2017-06-28 | End: 2017-06-29 | Stop reason: HOSPADM

## 2017-06-28 RX ORDER — ONDANSETRON 8 MG/1
8 TABLET, ORALLY DISINTEGRATING ORAL EVERY 8 HOURS PRN
Status: DISCONTINUED | OUTPATIENT
Start: 2017-06-28 | End: 2017-06-29 | Stop reason: HOSPADM

## 2017-06-28 RX ORDER — SIMETHICONE 80 MG
1 TABLET,CHEWABLE ORAL EVERY 6 HOURS PRN
Status: DISCONTINUED | OUTPATIENT
Start: 2017-06-28 | End: 2017-06-29 | Stop reason: HOSPADM

## 2017-06-28 RX ORDER — DOCUSATE SODIUM 100 MG/1
100 CAPSULE, LIQUID FILLED ORAL DAILY PRN
Status: DISCONTINUED | OUTPATIENT
Start: 2017-06-28 | End: 2017-06-29 | Stop reason: HOSPADM

## 2017-06-28 RX ADMIN — IBUPROFEN 600 MG: 600 TABLET, FILM COATED ORAL at 11:06

## 2017-06-28 RX ADMIN — IBUPROFEN 600 MG: 600 TABLET, FILM COATED ORAL at 05:06

## 2017-06-28 RX ADMIN — IBUPROFEN 600 MG: 600 TABLET, FILM COATED ORAL at 12:06

## 2017-06-28 RX ADMIN — OXYCODONE HYDROCHLORIDE AND ACETAMINOPHEN 1 TABLET: 5; 325 TABLET ORAL at 04:06

## 2017-06-28 RX ADMIN — PRENATAL WITH FERROUS FUM AND FOLIC ACID 1 EACH: 3080; 920; 120; 400; 22; 1.84; 3; 20; 10; 1; 12; 200; 27; 25; 2 TABLET ORAL at 09:06

## 2017-06-28 NOTE — PROGRESS NOTES
S: RN called and reported late decelerations, pitocin off  O:  VSS   FHTs: Cat 2, late decelerations   IUPC: every 2-3 min,    SVE: deferred  A:  IUP @ 4w6d   Second stage  P: Allow FHTs to recover and start pushing

## 2017-06-28 NOTE — SUBJECTIVE & OBJECTIVE
Hospital course: Admit for spontaneous active labor  NCB  Normal PP course    Patient is doing well this morning. Ambulating and voiding without difficulty. Lochia is small without clots. Mild abdominal cramping relieved with Motrin. Patient is breastfeeding. Using NFP for contraception. She desires circumcision for her male baby: not applicable.    Objective:     Vital Signs (Most Recent):  Temp: 97.9 °F (36.6 °C) (06/28/17 0810)  Pulse: 77 (06/28/17 0810)  Resp: 18 (06/28/17 0810)  BP: 116/67 (06/28/17 0810)  SpO2: 98 % (06/27/17 2239) Vital Signs (24h Range):  Temp:  [97.9 °F (36.6 °C)-99.9 °F (37.7 °C)] 97.9 °F (36.6 °C)  Pulse:  [] 77  Resp:  [18] 18  SpO2:  [88 %-100 %] 98 %  BP: ()/() 116/67     Weight: 99.8 kg (220 lb)  Body mass index is 37.76 kg/m².      Intake/Output Summary (Last 24 hours) at 06/28/17 0945  Last data filed at 06/28/17 0400   Gross per 24 hour   Intake                0 ml   Output             2050 ml   Net            -2050 ml       Significant Labs:  Lab Results   Component Value Date    GROUPTRH A POS 06/27/2017    HEPBSAG Negative 11/07/2016    STREPBCULT No Group B Streptococcus isolated 05/26/2017       Recent Labs  Lab 06/27/17  1138   HGB 13.3   HCT 38.2       I have personallly reviewed all pertinent lab results from the last 24 hours.    Physical Exam:   Constitutional: She is oriented to person, place, and time. She appears well-developed and well-nourished. No distress.        Pulmonary/Chest: Effort normal.        Abdominal: Soft. There is no tenderness.   Fundus firm at U-1                 Neurological: She is alert and oriented to person, place, and time.     Psychiatric: She has a normal mood and affect.

## 2017-06-28 NOTE — LACTATION NOTE
Lactation Rounds: Mother confident with breastfeeding. Lactation packet given and admit information reviewed. Mother verbalizes understanding of expected  behaviors and output for the first 48 hours of life.  Discussed the importance of cue based feedings on demand, unrestricted access to the breast, and frequent uninterrupted skin to skin contact.  Risk and implications of artificial nipples and supplementation discussed.  Encouraged mother to call for assistance when desired or when infant is showing signs of hunger, contact number provided, mother verbalizes understanding.

## 2017-06-28 NOTE — ANESTHESIA RELEASE NOTE
"Anesthesia Release from PACU Note    Patient: Liane Valiente    Procedure(s) Performed: * No procedures listed *    Anesthesia type: epidural    Post pain: Adequate analgesia    Post assessment: no apparent anesthetic complications and tolerated procedure well    Last Vitals:   Visit Vitals  BP (!) 118/42   Pulse 90   Temp 37 °C (98.6 °F) (Axillary)   Resp 18   Ht 5' 4" (1.626 m)   Wt 99.8 kg (220 lb)   LMP 09/14/2016   SpO2 100%   Breastfeeding? No   BMI 37.76 kg/m²       Post vital signs: stable    Level of consciousness: awake, alert  and oriented    Nausea/Vomiting: no nausea/no vomiting    Complications: none    Airway Patency: patent    Respiratory: unassisted, spontaneous ventilation, room air    Cardiovascular: stable and blood pressure at baseline    Hydration: euvolemic  "

## 2017-06-28 NOTE — PROGRESS NOTES
S: Pt comfortable with epidural  O:  VSS   FHTs: Cat 2, 140, intermittent variables   IUPC: q 3 minutes,  Pit @ 12 mu/min   SVE: 10/100/-2  A:  IUP @ 40w6d   Protracted labor   Second stage  P: Epidural heavy, anesthesia turned down pump to aid in pushing.   Labor down X 1 hour and then begin pushing unless patient has spontaneous desire to  push.

## 2017-06-28 NOTE — PLAN OF CARE
Problem: Patient Care Overview  Goal: Plan of Care Review  Outcome: Ongoing (interventions implemented as appropriate)  Pt pain controlled well with ibprofen.  Ambulated without difficulty. Fundus firm bleeding moderate.

## 2017-06-28 NOTE — TRANSFER OF CARE
"Anesthesia Transfer of Care Note    Patient: Liane Valiente    Procedure(s) Performed: * No procedures listed *    Patient location: Labor and Delivery    Anesthesia Type: epidural    Post pain: adequate analgesia    Post assessment: no apparent anesthetic complications    Post vital signs: stable    Level of consciousness: awake, alert and oriented    Nausea/Vomiting: no nausea/vomiting    Complications: none    Transfer of care protocol not completed      Last vitals:   Visit Vitals  BP (!) 118/42   Pulse 90   Temp 37 °C (98.6 °F) (Axillary)   Resp 18   Ht 5' 4" (1.626 m)   Wt 99.8 kg (220 lb)   LMP 09/14/2016   SpO2 100%   Breastfeeding? No   BMI 37.76 kg/m²     "

## 2017-06-28 NOTE — ANESTHESIA POSTPROCEDURE EVALUATION
"Anesthesia Post Evaluation    Patient: Liane Valiente    Procedure(s) Performed: * No procedures listed *    Final Anesthesia Type: epidural  Patient location during evaluation: labor & delivery  Patient participation: Yes- Able to Participate  Level of consciousness: awake and alert and oriented  Post-procedure vital signs: reviewed and stable  Pain management: adequate  Airway patency: patent  PONV status at discharge: No PONV  Anesthetic complications: no      Cardiovascular status: blood pressure returned to baseline  Respiratory status: unassisted, spontaneous ventilation and room air  Hydration status: euvolemic  Follow-up not needed.        Visit Vitals  BP (!) 118/42   Pulse 90   Temp 37 °C (98.6 °F) (Axillary)   Resp 18   Ht 5' 4" (1.626 m)   Wt 99.8 kg (220 lb)   LMP 09/14/2016   SpO2 100%   Breastfeeding? No   BMI 37.76 kg/m²       Pain/Antony Score: No Data Recorded      "

## 2017-06-29 VITALS
TEMPERATURE: 98 F | BODY MASS INDEX: 37.56 KG/M2 | SYSTOLIC BLOOD PRESSURE: 124 MMHG | RESPIRATION RATE: 18 BRPM | HEART RATE: 89 BPM | OXYGEN SATURATION: 98 % | DIASTOLIC BLOOD PRESSURE: 81 MMHG | WEIGHT: 220 LBS | HEIGHT: 64 IN

## 2017-06-29 PROCEDURE — 25000003 PHARM REV CODE 250: Performed by: ADVANCED PRACTICE MIDWIFE

## 2017-06-29 RX ADMIN — PRENATAL WITH FERROUS FUM AND FOLIC ACID 1 EACH: 3080; 920; 120; 400; 22; 1.84; 3; 20; 10; 1; 12; 200; 27; 25; 2 TABLET ORAL at 08:06

## 2017-06-29 RX ADMIN — OXYCODONE HYDROCHLORIDE AND ACETAMINOPHEN 1 TABLET: 5; 325 TABLET ORAL at 05:06

## 2017-06-29 RX ADMIN — IBUPROFEN 600 MG: 600 TABLET, FILM COATED ORAL at 05:06

## 2017-06-29 RX ADMIN — IBUPROFEN 600 MG: 600 TABLET, FILM COATED ORAL at 11:06

## 2017-06-29 NOTE — DISCHARGE SUMMARY
[facility] -   Obstetrics  Discharge Summary      Patient Name: Liane Valiente  MRN: [id number]  Admission Date: 2017  Hospital Length of Stay: 2 days  Discharge Date and Time:  2017 11:50 AM  Attending Physician: Dede Powell MD   Discharging Provider: Laura Thompson CNM  Primary Care Provider: Mandi England MD    HPI: C/o ctx and possible LOF this morning     * No surgery found *     Hospital Course:   Admit for spontaneous active labor  NCB  Normal PP course  Ready for discharge        Final Active Diagnoses:    Diagnosis Date Noted POA    PRINCIPAL PROBLEM:   (normal spontaneous vaginal delivery) [O80] 2017 Not Applicable    Single liveborn [Z38.2] 2017 Unknown      Problems Resolved During this Admission:    Diagnosis Date Noted Date Resolved POA    Normal labor [O80, Z37.9] 2017 Not Applicable    Labor abnormal [O62.9] 2017 Unknown        Labs: CBC No results for input(s): WBC, HGB, HCT, PLT in the last 48 hours.    Feeding Method: breast    Immunizations     Date Immunization Status Dose Route/Site Given by    17 0128 MMR Incomplete 0.5 mL Subcutaneous/Left deltoid           Delivery:    Episiotomy: None   Lacerations: None   Repair suture: None   Repair # of packets:     Blood loss (ml): 150     Birth information:  YOB: 2019   Time of birth: 10:38 PM   Sex: female   Delivery type: Vaginal, Spontaneous Delivery   Gestational Age: 40w6d    Delivery Clinician:      Other providers:       Additional  information:  Forceps:    Vacuum:    Breech:    Observed anomalies      Living?:           APGARS  One minute Five minutes Ten minutes   Skin color:         Heart rate:         Grimace:         Muscle tone:         Breathing:         Totals: 4  9  9      Placenta: Delivered:       appearance    Pending Diagnostic Studies:     None          Discharged Condition: good    Disposition: Home or Self  Care    Follow Up:  Follow-up Information     Follow up In 4 weeks.               Patient Instructions:     Diet general     Activity as tolerated       Medications:  Current Discharge Medication List      CONTINUE these medications which have NOT CHANGED    Details   prenatal vitamin #45-iron-FA 28 mg iron- 1 mg Chew Take by mouth.             Laura Thompson CNM  Obstetrics  [facility]

## 2017-06-29 NOTE — SUBJECTIVE & OBJECTIVE
Hospital course: Admit for spontaneous active labor  NCB  Normal PP course  Ready for discharge    Patient is breastfeeding. Using IUD for contraception. She desires circumcision for her male baby: not applicable.    Objective:     Vital Signs (Most Recent):  Temp: 97.4 °F (36.3 °C) (06/29/17 0800)  Pulse: 97 (06/29/17 0800)  Resp: 18 (06/29/17 0800)  BP: 125/82 (06/29/17 0800)  SpO2: 98 % (06/27/17 2239) Vital Signs (24h Range):  Temp:  [96.7 °F (35.9 °C)-98.2 °F (36.8 °C)] 97.4 °F (36.3 °C)  Pulse:  [63-97] 97  Resp:  [15-19] 18  BP: (112-125)/(63-82) 125/82     Weight: 99.8 kg (220 lb)  Body mass index is 37.76 kg/m².    No intake or output data in the 24 hours ending 06/29/17 1147    Significant Labs:  Lab Results   Component Value Date    GROUPTRH A POS 06/27/2017    HEPBSAG Negative 11/07/2016    STREPBCULT No Group B Streptococcus isolated 05/26/2017     No results for input(s): HGB, HCT in the last 48 hours.    I have personallly reviewed all pertinent lab results from the last 24 hours.    Physical Exam:   Constitutional: She is oriented to person, place, and time. She appears well-developed and well-nourished.     Eyes: Conjunctivae are normal. Pupils are equal, round, and reactive to light.    Neck: Normal range of motion.    Cardiovascular: Normal rate and regular rhythm.     Pulmonary/Chest: Breath sounds normal.        Abdominal: Soft.     Genitourinary: Vagina normal and uterus normal.           Musculoskeletal: Normal range of motion and moves all extremeties.       Neurological: She is alert and oriented to person, place, and time.    Skin: Skin is warm.    Psychiatric: She has a normal mood and affect. Her behavior is normal. Thought content normal.

## 2017-06-29 NOTE — PLAN OF CARE
Problem: Patient Care Overview  Goal: Plan of Care Review  Outcome: Ongoing (interventions implemented as appropriate)  Patient is stable. Patient has stable VS. Patient is ambulating with no assistance. t.

## 2017-06-29 NOTE — PLAN OF CARE
Discharge instructions given to patient.  Verbalized understanding.  D/c'ed per w/c with infant on lap.  Essentially no change in initial assessment.

## 2017-06-29 NOTE — DISCHARGE INSTRUCTIONS
"Mother Self Care:    Activity: Avoid strenuous exercise and get adequate rest.  No driving until the physician consent given.  Emotional Changes: Most women find birth to be a time of great emotional upheaval.  Sense of loss, mood swings, fatigue, anxiety, and feeling "let down" are common.  If feelings worsen or last more than a week, call your physician.  Breast Care/Breastfeeding: Wear a bra for comfort.  Keep nipples dry and apply your own breast milk or lanolin cream as needed for soreness.  Engorgement can be relieved with warm, moist heat before feedings.  You may also take Ibuprofen.  Breast Care/Bottle Feeding: Wear support bra 24 hours a day for one week.  Avoid stimulation to breasts.  You may use ice packs for discomfort.  Ernie-Care/Vaginal Bleeding: Remember to use your ernie-bottle after urinating.  Your flow will change from red, to pink, to yellow/white color over a period of 2 weeks.  Menstruation will return in 3-8 weeks, or longer if breastfeeding.  Episiotomy Vaginal Delivery: Stitches will dissolve within 10 days to 3 weeks.  Warm baths, tucks, and dermoplast will promote healing.  Avoid bubble baths or strong soaps.   Section/Tubal Ligation: Keep incision clean and dry.  Please remove steri-strips in 5-7 days.  You may shower, but avoid baths.  Sexual Activity/Pelvic Rest: No sexual activity, tampons, or douching until your physician gives you consent.  Diet: Continue to eat from the five basic food groups, including plenty of protein, fruits, vegetables, and whole grains.  Limit empty calories and high fat foods.  Drink enough fluids to satisfy thirst and add an extra 500 calories for breastfeeding.  Constipation/Hemorrhoids: Drink plenty of water.  You may take a stool softener or natural laxative (Metamucil). You may use tucks or hemorrhoid ointment and soak in a warm tub.    CALL YOUR OB DOCTOR IF ANY OF THE FOLLOWING OCCURS:  *Heavy bleeding - saturating a pad an hour or passing any " large (2-3 inches in size) blood clots.  *Any pain, redness, or tenderness in lower leg.  *You cannot care for yourself or your baby.  *Any signs of infection-      - Temperature greater than 100.5 degrees F      - Foul smelling vaginal discharge and/or incisional drainage      - Increased episiotomy or incisional pain      - Hot, hard, red or sore area on breast      - Flu-like symptoms      - Any urgency, frequency or burning with urination

## 2017-06-29 NOTE — PROGRESS NOTES
[facility] -   Obstetrics  Postpartum Progress Note    Patient Name: Liane Valiente  MRN: [id number]  Admission Date: 2017  Hospital Length of Stay: 2 days  Attending Physician: Dede Powell MD  Primary Care Provider: Mandi England MD    Subjective:     Principal Problem: (normal spontaneous vaginal delivery)    Hospital course: Admit for spontaneous active labor  NCB  Normal PP course  Ready for discharge    Patient is breastfeeding. Using IUD for contraception. She desires circumcision for her male baby: not applicable.    Objective:     Vital Signs (Most Recent):  Temp: 97.4 °F (36.3 °C) (17 0800)  Pulse: 97 (17 0800)  Resp: 18 (17 0800)  BP: 125/82 (17 0800)  SpO2: 98 % (17 2239) Vital Signs (24h Range):  Temp:  [96.7 °F (35.9 °C)-98.2 °F (36.8 °C)] 97.4 °F (36.3 °C)  Pulse:  [63-97] 97  Resp:  [15-19] 18  BP: (112-125)/(63-82) 125/82     Weight: 99.8 kg (220 lb)  Body mass index is 37.76 kg/m².    No intake or output data in the 24 hours ending 17 1147    Significant Labs:  Lab Results   Component Value Date    GROUPTRH A POS 2017    HEPBSAG Negative 2016    STREPBCULT No Group B Streptococcus isolated 2017     No results for input(s): HGB, HCT in the last 48 hours.    I have personallly reviewed all pertinent lab results from the last 24 hours.    Physical Exam:   Constitutional: She is oriented to person, place, and time. She appears well-developed and well-nourished.     Eyes: Conjunctivae are normal. Pupils are equal, round, and reactive to light.    Neck: Normal range of motion.    Cardiovascular: Normal rate and regular rhythm.     Pulmonary/Chest: Breath sounds normal.        Abdominal: Soft.     Genitourinary: Vagina normal and uterus normal.           Musculoskeletal: Normal range of motion and moves all extremeties.       Neurological: She is alert and oriented to person, place, and time.    Skin: Skin is warm.     Psychiatric: She has a normal mood and affect. Her behavior is normal. Thought content normal.       Assessment/Plan:     26 y.o. female  for:    *  (normal spontaneous vaginal delivery)    Normal pp course  Desires IUD pp  P discharge home   Follow up 4 weeks            Disposition: As patient meets milestones, will plan to discharge today    Laura Thompson CNM  Obstetrics  [facility]

## 2017-06-29 NOTE — PLAN OF CARE
Problem: Patient Care Overview  Goal: Plan of Care Review  Outcome: Ongoing (interventions implemented as appropriate)  Pt progressing well. Vss. Pain controlled with scheduled motrin and 1 dose of percocet 5. States she wants to stay away from the percocet as much as possible, but at that time the pain was making her very uncomfortable. Bleeding light to scant, voids frequently, fundus firm without massage. Bonding well with infant, named her. Breastfeeding well, is very confident due to breastfeeding other 2 children along with tandem nursing.

## 2017-06-29 NOTE — L&D DELIVERY NOTE
Delivery Information for  Millie Valiente    Birth information:  YOB: 2019   Time of birth: 10:38 PM   Sex: female   Head Delivery Date/Time: 2017 10:37 PM   Delivery type: Vaginal, Spontaneous Delivery   Gestational Age: 40w6d    Delivery Providers    Delivering clinician:  Kaitlyn Lorenzana CNM   Other personnel:   Provider Role   Kem Rosales RN Registered Nurse   Latrice Jernigan Surgical Tech   Crys Del Rio RN Registered Nurse   Susu Vann, RN Registered Nurse                Measurements    Weight:  3785 g Length:  53 cm   Head circum.:  36 cm Chest circum.:  34.5 cm   Abdominal girth:  35 cm          Assessment    Living status:  Living  Apgars:     1 Minute:   5 Minute:   10 Minute:   15 Minute:   20 Minute:     Skin Color:   0  1  1      Heart Rate:   2  2  2      Reflex Irritability:   1  2  2      Muscle Tone:   0  2  2      Respiratory Effort:   1  2  2      Total:   4  9  9              Apgars Assigned By:  MICHELET LORENZANA CNM         Assisted Delivery Details:    Forceps attempted?:  No  Vacuum extractor attempted?:  No         Shoulder Dystocia    Shoulder dystocia present?:  No           Presentation and Position    Presentation:   Vertex   Position:   Right    Occiput    Posterior            Interventions/Resuscitation    Method:  Bulb Suctioning, Blow By Oxygen, Tactile Stimulation, Deep Suctioning       Cord    Vessels:  3 vessels  Complications:  Nuchal  Nuchal Intervention:  reduced  Nuchal Cord Description:  tight nuchal cord  Number of Loops:  1  Delayed Cord Clamping?:  No  Cord Clamped Date/Time:  2017 10:38 PM  Cord Blood Disposition:  Lab  Gases Sent?:  No  Stem Cell Collection (by MD):  No       Placenta    Date and time:  2017 10:46 PM  Removal:  Spontaneous  Appearance:  Intact  Placenta disposition:  family           Labor Events:       labor: No     Labor Onset Date/Time:         Dilation Complete Date/Time: 2017  19:53     Start Pushing Date/Time: 2017 21:45     Rupture Date/Time:              Rupture type:           Fluid Amount:        Fluid Color:        Fluid Odor:        Membrane Status (PeriCalm): ARM (Artificial Rupture)      Rupture Date/Time (PeriCalm): 2017 13:45:00      Fluid Amount (PeriCalm): Moderate      Fluid Color (PeriCalm): Clear       steroids: None     Antibiotics given for GBS: No     Induction: none     Indications for induction:        Augmentation:       Indications for augmentation:       Labor complications: None     Additional complications:          Cervical ripening:                     Delivery:      Episiotomy: None     Indication for Episiotomy:       Perineal Lacerations: None Repaired:      Periurethral Laceration: none Repaired:     Labial Laceration: none Repaired:     Sulcus Laceration: none Repaired:     Vaginal Laceration: No Repaired:     Cervical Laceration: No Repaired:     Repair suture: None     Repair # of packets:       Vaginal delivery QBL (mL): 150      QBL (mL): 0     Combined Blood Loss (mL): 150     Vaginal Sweep Performed:       Surgicount Correct:         Other providers:       Anesthesia    Method:  Epidural          Details (if applicable):  Trial of Labor      Categorization:      Priority:     Indications for :     Incision Type:       Additional  information:  Forceps:    Vacuum:    Breech:    Observed anomalies    Other (Comments):  of a viable female infant with epidural anesthesia. Head delivered with maternal pushing effort, very tight nuchal X 1, reduced with somersault maneuver after delivery of the anterior and posterior shoulders. Cord clamped X 2 and cut immediately   secondary to Infant having poor tone, color and respiratory effort to awaiting transition nurse. Active management, Pitocin IVF. Placenta delivered intact via Harris with CCT. Small blood clots and Port wine blood preceded  delivery of the placenta.   10% abruption. Fundus firm, bleeding scant. Intact vagina and perineum.  mL. Infant skin to skin with mom, stable. Apgars 4/9/9. Weight and measurements pending.     RY Martinez/JUSTINE Peters

## 2017-06-29 NOTE — LACTATION NOTE
Lactation Rounds: Infant wt loss and output WNL. Mother states she is confident in ability to position and latch infant independently to both breasts, denies pain associated with latch,reports audible swallows throughout feeding. Mother states she feels confident for discharge as she recently stopped nursing 1 year old and is considering tandem nursing.  Lactation discharge information reviewed.  Mother is aware of warm line, and outpatient consultations and monthly support gatherings. Encouraged mother to contact lactation with any questions, concerns, or problems. Contact numbers provided, and mother verbalizes understanding.  Parents deny any issues/concerns at this time.      06/29/17 1000   Infant Assessment   Weight Loss (%) 3.6   Number of Stools (24 hours) 5   Number of Voids (24 hours) 4   Lactation Interventions   Attachment Promotion breastfeeding assistance provided;counseling provided;skin-to-skin contact encouraged;rooming-in promoted;family involvement promoted;infant-mother separation minimized   Breastfeeding Assistance support offered;both breasts offered each feeding;feeding cue recognition promoted;feeding on demand promoted   Maternal Breastfeeding Support encouragement offered;lactation counseling provided

## 2017-07-05 ENCOUNTER — TELEPHONE (OUTPATIENT)
Dept: OBSTETRICS AND GYNECOLOGY | Facility: CLINIC | Age: 27
End: 2017-07-05

## 2017-07-05 NOTE — TELEPHONE ENCOUNTER
Attempted to contact patient, no answer.  Left patient a voicemail message to call the clinic back.

## 2017-07-05 NOTE — TELEPHONE ENCOUNTER
----- Message from Milady Byers sent at 7/5/2017 11:43 AM CDT -----  Contact: pt  Pt request call concerning Paragard paperwork, pt can be reached at [phone]///thxMW

## 2017-07-07 NOTE — TELEPHONE ENCOUNTER
Spoke with pt states Laura needed her to sign Nexplan[employer] form but the fax never made it to the hospital. Informed pt that [facility] can [facility] to [facility] form and we can have the midwife sign off and fax. Patient verbalized understanding

## 2017-07-27 ENCOUNTER — TELEPHONE (OUTPATIENT)
Dept: OBSTETRICS AND GYNECOLOGY | Facility: CLINIC | Age: 27
End: 2017-07-27

## 2017-07-27 ENCOUNTER — PATIENT MESSAGE (OUTPATIENT)
Dept: OBSTETRICS AND GYNECOLOGY | Facility: CLINIC | Age: 27
End: 2017-07-27

## 2017-07-27 NOTE — TELEPHONE ENCOUNTER
----- Message from Jessy Villa sent at 7/27/2017 11:42 AM CDT -----  Contact: [phone]  Pt would like to consult with nurse about birth control. She has questions about the company she goes through. [employer]. Please call pt back at [phone]. Thx. linnette

## 2017-07-27 NOTE — TELEPHONE ENCOUNTER
----- Message from Rosa Lockhart sent at 7/27/2017 12:01 PM CDT -----  Contact: Patient   Patient returned call, Please call her at [phone], Regards to if she needs to keep her appointment for 7/31/17.    Thanks  Td

## 2017-08-08 ENCOUNTER — POSTPARTUM VISIT (OUTPATIENT)
Dept: OBSTETRICS AND GYNECOLOGY | Facility: CLINIC | Age: 27
End: 2017-08-08
Payer: COMMERCIAL

## 2017-08-08 VITALS — BODY MASS INDEX: 35.29 KG/M2 | HEIGHT: 64 IN | WEIGHT: 206.69 LBS

## 2017-08-08 DIAGNOSIS — Z97.5 CONTRACEPTION, DEVICE INTRAUTERINE: Primary | ICD-10-CM

## 2017-08-08 PROCEDURE — 81025 URINE PREGNANCY TEST: CPT | Mod: QW,S$GLB,, | Performed by: ADVANCED PRACTICE MIDWIFE

## 2017-08-08 PROCEDURE — 99999 PR PBB SHADOW E&M-EST. PATIENT-LVL II: CPT | Mod: PBBFAC,,, | Performed by: ADVANCED PRACTICE MIDWIFE

## 2017-08-08 PROCEDURE — 58300 INSERT INTRAUTERINE DEVICE: CPT | Mod: S$GLB,,, | Performed by: ADVANCED PRACTICE MIDWIFE

## 2017-08-08 NOTE — PROGRESS NOTES
CC: IUD PLACEMENT    Liane Valiente is a 26 y.o. female  presents for a paragard IUD placement.  UPT is negative.        PRE-IUD PLACEMENT COUNSELING:  All contraceptive options were reviewed and the patient chooses an IUD.  The patient's history was reviewed and there are no contraindications to an IUD. The procedure and minimal risks of pain, bleeding, perforation and infection at the insertion and spontaneous expulsion within the first two weeks was discussed. The benefits of amenorrhea and no systemic side effects were explained. All questions were answered and the patient agrees to proceed. Consent was signed (scanned into computer).    EXAM:  Uterine Position: normal size, nontender    PROCEDURE:  TIME OUT PERFORMED.  The cervix visualized with a speculum.  A single tooth tenaculum placed on the anterior lip.  The uterus sounded to 7 cm using sterile technique.  A ParaGard IUD was loaded and placed high in uterine fundus without difficulty using sterile technique.  The string was cut to 2-3cm length from exo cervix.  The tenaculum and speculum were removed. The patient tolerated the procedure well.    ASSESSMENT:  1. Contraception management / IUD insertion.V25.0.    POST IUD PLACEMENT COUNSELING:  Manage post IUD placement pain with NSAIDs, Tylenol or Rx per MedCard.  IUD danger signs and how to check the strings.  Removal in 5 years for Mirena IUD and in 10 years for Copper IUD.    Counseling lasted approximately 15 minutes and all her questions were answered.    FOLLOW-UP: With me in 4 weeks.

## 2017-09-11 ENCOUNTER — OFFICE VISIT (OUTPATIENT)
Dept: OBSTETRICS AND GYNECOLOGY | Facility: CLINIC | Age: 27
End: 2017-09-11
Payer: COMMERCIAL

## 2017-09-11 VITALS
BODY MASS INDEX: 35.66 KG/M2 | WEIGHT: 208.88 LBS | DIASTOLIC BLOOD PRESSURE: 72 MMHG | HEIGHT: 64 IN | SYSTOLIC BLOOD PRESSURE: 126 MMHG

## 2017-09-11 DIAGNOSIS — Z30.431 IUD CHECK UP: Primary | ICD-10-CM

## 2017-09-11 PROCEDURE — 99213 OFFICE O/P EST LOW 20 MIN: CPT | Mod: S$GLB,,, | Performed by: ADVANCED PRACTICE MIDWIFE

## 2017-09-11 PROCEDURE — 99999 PR PBB SHADOW E&M-EST. PATIENT-LVL II: CPT | Mod: PBBFAC,,, | Performed by: ADVANCED PRACTICE MIDWIFE

## 2017-09-11 NOTE — PROGRESS NOTES
"S: Here for IUD string check. Denies pain or bleeding with IUD, occasional cramping only.   O: Alert and oriented, NAD, /72   Ht 5' 4" (1.626 m)   Wt 94.7 kg (208 lb 14.2 oz)   LMP 09/14/2016   Breastfeeding? Yes   BMI 35.86 kg/m²   Cervix visualized, strings present  A: Normal Paragard IUD  P: bleeding precautions, Routine gyn care, RTC 1 year  "

## 2017-10-16 ENCOUNTER — TELEPHONE (OUTPATIENT)
Dept: OBSTETRICS AND GYNECOLOGY | Facility: CLINIC | Age: 27
End: 2017-10-16

## 2017-10-16 NOTE — TELEPHONE ENCOUNTER
----- Message from Kayla Byers sent at 10/16/2017  8:49 AM CDT -----  Patient states she have questions regarding breast feeding. Please adv/call [phone].//thanks. cw

## 2018-07-13 ENCOUNTER — TELEPHONE (OUTPATIENT)
Dept: OBSTETRICS AND GYNECOLOGY | Facility: CLINIC | Age: 28
End: 2018-07-13

## 2018-07-13 NOTE — TELEPHONE ENCOUNTER
----- Message from Lenny Lorenz sent at 7/13/2018  1:51 PM CDT -----  Contact: PT   Pt having stomach cramps and is concerned iud has moved, would like to speak to nurse about concerns.       ..[phone] (home)

## 2018-07-13 NOTE — TELEPHONE ENCOUNTER
Spoke with pt, states that she has a Paragard IUD and for the last few weeks she has been having lower abdominal pain and states that she is concerned about it having possibly moved. Pt denies any fever or foul smelling odor. Pt states that she has had a bit of a thicker discharge than usual but states that it may be due to ovulation. States that she is still breastfeeding but is having regular cycles and that it is not time for her to start her cycle. Advised pt to alternate ibuprofen and tylenol every 4 hours to help with the pain. Notified pt she would need to come in for an appointment for an IUD check. Scheduled pt an appointment for 7/16/18 at 0900 with Emily Allen. Advised pt that if over the week she starts develops fever or excruciating pain that the tylenol/ ibuprofen is not helping then she would need to report to the ED for evaluation. Pt verbalized understanding.

## 2018-07-16 ENCOUNTER — HOSPITAL ENCOUNTER (OUTPATIENT)
Dept: RADIOLOGY | Facility: HOSPITAL | Age: 28
Discharge: HOME | End: 2018-07-16
Attending: NURSE PRACTITIONER
Payer: COMMERCIAL

## 2018-07-16 ENCOUNTER — OFFICE VISIT (OUTPATIENT)
Dept: OBSTETRICS AND GYNECOLOGY | Facility: CLINIC | Age: 28
End: 2018-07-16
Payer: COMMERCIAL

## 2018-07-16 VITALS
SYSTOLIC BLOOD PRESSURE: 122 MMHG | WEIGHT: 188.69 LBS | DIASTOLIC BLOOD PRESSURE: 78 MMHG | BODY MASS INDEX: 32.21 KG/M2 | HEIGHT: 64 IN

## 2018-07-16 DIAGNOSIS — R10.2 PELVIC PAIN IN FEMALE: ICD-10-CM

## 2018-07-16 DIAGNOSIS — R10.2 PELVIC PAIN IN FEMALE: Primary | ICD-10-CM

## 2018-07-16 DIAGNOSIS — Z30.431 IUD CHECK UP: ICD-10-CM

## 2018-07-16 PROCEDURE — 76856 US EXAM PELVIC COMPLETE: CPT | Mod: 26,,, | Performed by: RADIOLOGY

## 2018-07-16 PROCEDURE — 87660 TRICHOMONAS VAGIN DIR PROBE: CPT

## 2018-07-16 PROCEDURE — 99213 OFFICE O/P EST LOW 20 MIN: CPT | Mod: S$GLB,,, | Performed by: NURSE PRACTITIONER

## 2018-07-16 PROCEDURE — 99999 PR PBB SHADOW E&M-EST. PATIENT-LVL II: CPT | Mod: PBBFAC,,, | Performed by: NURSE PRACTITIONER

## 2018-07-16 PROCEDURE — 76830 TRANSVAGINAL US NON-OB: CPT | Mod: TC

## 2018-07-16 PROCEDURE — 87086 URINE CULTURE/COLONY COUNT: CPT

## 2018-07-16 PROCEDURE — 76830 TRANSVAGINAL US NON-OB: CPT | Mod: 26,,, | Performed by: RADIOLOGY

## 2018-07-16 NOTE — PROGRESS NOTES
"CC: Pelvic pain    Liane Valiente is a 27 y.o. female  presents for pelvic pain. Patient has IUD/Paragrd and is concerned that she is having pelvic pain for 2 months. No dysuria, Urine in clinic  was negative. Last pap exam was normal, 2016.     History reviewed. No pertinent past medical history.  History reviewed. No pertinent surgical history.  Social History     Social History    Marital status:      Spouse name: N/A    Number of children: N/A    Years of education: N/A     Occupational History    Not on file.     Social History Main Topics    Smoking status: Never Smoker    Smokeless tobacco: Not on file    Alcohol use No      Comment: occasional    Drug use: No    Sexual activity: Yes     Partners: Male     Birth control/ protection: IUD     Other Topics Concern    Not on file     Social History Narrative    No narrative on file     Family History   Problem Relation Age of Onset    Heart disease Other     ADD / ADHD Other     Pancreatic cancer Paternal Grandfather     Lung cancer Maternal Grandfather     Lung disease Mother      OB History      Para Term  AB Living    3 2 2     2    SAB TAB Ectopic Multiple Live Births          0 2          /78   Ht 5' 4" (1.626 m)   Wt 85.6 kg (188 lb 11.4 oz)   Breastfeeding? Yes   BMI 32.39 kg/m²       ROS:  GENERAL: Denies weight gain or weight loss. Feeling well overall.   SKIN: Denies rash or lesions.   HEAD: Denies head injury or headache.   NODES: Denies enlarged lymph nodes.   CHEST: Denies chest pain or shortness of breath.   CARDIOVASCULAR: Denies palpitations or left sided chest pain.   ABDOMEN: HPI  URINARY: No frequency, dysuria, hematuria, or burning on urination.  REPRODUCTIVE: See HPI.   BREASTS: The patient performs breast self-examination and denies pain, lumps, or nipple discharge.   HEMATOLOGIC: No easy bruisability or excessive bleeding.   MUSCULOSKELETAL: Denies joint pain or swelling. "   NEUROLOGIC: Denies syncope or weakness.   PSYCHIATRIC: Denies depression, anxiety or mood swings.    PHYSICAL EXAM:  APPEARANCE: Well nourished, well developed, in no acute distress.  AFFECT: WNL, alert and oriented x 3  SKIN: No acne or hirsutism  NECK: Neck symmetric without masses or thyromegaly  NODES: No inguinal, cervical, axillary, or femoral lymph node enlargement  CHEST: Good respiratory effect  ABDOMEN: Soft.  No tenderness or masses.  No hepatosplenomegaly.  No hernias.  BREASTS: Symmetrical, no skin changes or visible lesions.  No palpable masses, nipple discharge bilaterally.  PELVIC: Normal external genitalia without lesions.  Normal hair distribution.  Adequate perineal body, normal urethral meatus.  Vagina moist and well rugated without lesions, scant white discharge.  Cervix pink, without lesions, discharge or tenderness, IUD strings seen on exam. Bimanual exam shows uterus to be normal size, regular, mobile and nontender.  Adnexa without masses or tenderness.    EXTREMITIES: No edema.    1. Pelvic pain in female  Urine culture    US Pelvis Complete Non OB    Vaginosis Screen by DNA Probe   2. IUD check up       PLAN:  Pelvic ultrasound  Urine sent for cx  Affirm cx        Patient was counseled today on A.C.S. Pap guidelines and recommendations for yearly pelvic exams, mammograms and monthly self breast exams; to see her PCP for other health maintenance.

## 2018-07-17 ENCOUNTER — TELEPHONE (OUTPATIENT)
Dept: OBSTETRICS AND GYNECOLOGY | Facility: CLINIC | Age: 28
End: 2018-07-17

## 2018-07-17 ENCOUNTER — PATIENT MESSAGE (OUTPATIENT)
Dept: OBSTETRICS AND GYNECOLOGY | Facility: CLINIC | Age: 28
End: 2018-07-17

## 2018-07-17 LAB
CANDIDA RRNA VAG QL PROBE: NEGATIVE
G VAGINALIS RRNA GENITAL QL PROBE: NEGATIVE
T VAGINALIS RRNA GENITAL QL PROBE: NEGATIVE

## 2018-07-17 NOTE — TELEPHONE ENCOUNTER
----- Message from Zita Roe sent at 7/17/2018  8:37 AM CDT -----  Contact: pt   Pt is returning the nurse call.                                                       [phone] (home)

## 2018-07-17 NOTE — TELEPHONE ENCOUNTER
Culture was negative.     Patient questioning ultrasound results.  Patient made away IUD in correct position and cyst on left ovary.  Will forward to MAITE Allen for advice.

## 2018-07-18 LAB — BACTERIA UR CULT: NORMAL

## 2018-08-16 ENCOUNTER — OFFICE VISIT (OUTPATIENT)
Dept: OBSTETRICS AND GYNECOLOGY | Facility: CLINIC | Age: 28
End: 2018-08-16
Payer: COMMERCIAL

## 2018-08-16 VITALS
BODY MASS INDEX: 30.48 KG/M2 | SYSTOLIC BLOOD PRESSURE: 118 MMHG | DIASTOLIC BLOOD PRESSURE: 76 MMHG | WEIGHT: 178.56 LBS | HEIGHT: 64 IN

## 2018-08-16 DIAGNOSIS — N83.292 COMPLEX CYST OF LEFT OVARY: Primary | ICD-10-CM

## 2018-08-16 DIAGNOSIS — R10.32 LEFT LOWER QUADRANT PAIN: ICD-10-CM

## 2018-08-16 LAB
BACTERIA #/AREA URNS AUTO: NORMAL /HPF
BILIRUB UR QL STRIP: NEGATIVE
CLARITY UR REFRACT.AUTO: CLEAR
COLOR UR AUTO: COLORLESS
GLUCOSE UR QL STRIP: NEGATIVE
HGB UR QL STRIP: ABNORMAL
KETONES UR QL STRIP: ABNORMAL
LEUKOCYTE ESTERASE UR QL STRIP: NEGATIVE
MICROSCOPIC COMMENT: NORMAL
NITRITE UR QL STRIP: NEGATIVE
PH UR STRIP: 6 [PH] (ref 5–8)
PROT UR QL STRIP: NEGATIVE
RBC #/AREA URNS AUTO: 4 /HPF (ref 0–4)
SP GR UR STRIP: 1 (ref 1–1.03)
SQUAMOUS #/AREA URNS AUTO: 0 /HPF
URN SPEC COLLECT METH UR: ABNORMAL
UROBILINOGEN UR STRIP-ACNC: NEGATIVE EU/DL
WBC #/AREA URNS AUTO: 0 /HPF (ref 0–5)

## 2018-08-16 PROCEDURE — 87086 URINE CULTURE/COLONY COUNT: CPT

## 2018-08-16 PROCEDURE — 99999 PR PBB SHADOW E&M-EST. PATIENT-LVL III: CPT | Mod: PBBFAC,,, | Performed by: OBSTETRICS & GYNECOLOGY

## 2018-08-16 PROCEDURE — 81001 URINALYSIS AUTO W/SCOPE: CPT

## 2018-08-16 PROCEDURE — 99213 OFFICE O/P EST LOW 20 MIN: CPT | Mod: S$GLB,,, | Performed by: OBSTETRICS & GYNECOLOGY

## 2018-08-16 RX ORDER — TRAMADOL HYDROCHLORIDE 50 MG/1
50 TABLET ORAL
COMMUNITY
Start: 2016-05-27 | End: 2018-08-16

## 2018-08-16 RX ORDER — NAPROXEN 375 MG/1
375 TABLET ORAL 2 TIMES DAILY
COMMUNITY

## 2018-08-16 RX ORDER — MELOXICAM 15 MG/1
15 TABLET ORAL
COMMUNITY
Start: 2016-05-27 | End: 2018-08-16

## 2018-08-16 NOTE — PATIENT INSTRUCTIONS
Understanding Ovarian Cysts  An ovarian cyst is a fluid-filled sac that forms on or inside an ovary. The ovaries are a pair of small, oval-shaped organs in the lower part of a womans belly (abdomen). About once a month, one of the ovaries releases an egg. The ovaries also make the hormones estrogen and progesterone. These hormones are part of pregnancy, the menstrual cycle, and breast growth.  Ovarian cysts are very common in women of all ages. Young girls can also get them, but this is less common. There are different types of ovarian cysts. They can occur for various reasons, and they may need different treatments. A cyst can vary in size from half an inch to more than 4 inches.  Types of ovarian cysts  There are different types of ovarian cysts:  Functional cyst  This is the most common type of ovarian cyst. They only occur in women who havent gone through menopause. There are 2 types of functional cysts:  · Follicular cyst. This cyst happens when an egg isnt released and it keeps growing inside the ovary.  · Corpus luteum cyst. This type of cyst occurs when the sac around the egg doesnt dissolve after the egg is released.  Endometrioma  This is a cyst filled with old blood and tissue from the lining of the uterus. They are often called chocolate cysts because of their dark color. They can happen in women with endometriosis.  Dermoid cyst  This cyst develops from ovarian cells and eggs. They may have hair, skin, or fat in them. These cysts are common in women of childbearing age.  What causes ovarian cysts?  Cysts can also be caused by:  · Polycystic ovary syndrome (PCOS), a condition that causes multiple cysts on the ovaries  · Pregnancy  · Severe pelvic infection, such as chlamydia  · Noncancerous growths  · Cancer (rare)  · Using fertility medicine to cause ovulation, such as clomiphene  Symptoms of an ovarian cyst  Many women dont have any symptoms from the cyst. In women with symptoms, the most common  is pain or pressure in your lower belly on the side of the cyst. This pain may be dull or sharp, and it may come and go. A cyst that breaks open (ruptures) may lead to sudden, sharp pain.  Other symptoms of an ovarian cyst can include:  · Pain in the lower back or thighs  · Trouble emptying your bladder fully  · Pain during sex  · Weight gain  · Pain during your period  · Breast tenderness  · Abnormal vaginal bleeding (rare)  Diagnosing an ovarian cyst  Your primary care doctor or an obstetrics and gynecology (OB/GYN) doctor may diagnose the condition. Your doctor will ask about your health history and your symptoms. You will also have a physical exam. This will likely include a pelvic exam. During the pelvic exam, your doctor may feel the swelling on your ovary. In women with no symptoms, this is often the first sign of a cyst.  If your doctor thinks you may have an ovarian cyst, you may need tests. These can help your doctor learn the type of cyst. Tests can also help rule out other problems, such as an ectopic pregnancy. The tests may include:  · Ultrasound. This test uses sound waves to view the size, shape, and location of the cyst. The test can also show if the growth is solid or filled with fluid.  · MRI. This uses large magnets and a computer to create a detailed picture of the area.  · Pregnancy test. This is done to check if pregnancy may be the cause of the cyst.  · Blood tests. These check for hormone problems and cancer. They also check if the cyst is bleeding.  · Biopsy. This is a test where a tiny piece of the ovary is taken. The piece is examined in a lab for cancer cells. This may be done if an ultrasound shows a certain type of growth on the ovary.  Date Last Reviewed: 5/6/2015  © 4173-2260 [employer]. [address] [place] [address], [place] [address]. All rights reserved. This information is not intended as a substitute for professional medical care. Always follow your healthcare  professional's instructions.        Acute Pain, Uncertain Cause  Pain can be caused by many conditions that range from very minor to very serious. In some cases, though, pain comes and goes with no apparent cause.  We were not able to find the exact cause for your pain. At this time there is no sign of any serious illness causing your pain. More tests may be needed to determine the cause. In many cases, pain like this goes away by itself.  Home care  Take any medicines as prescribed. If another medicine was not prescribed for pain, you can take an over-the-counter pain medicine such as ibuprofen or acetaminophen. Use these as directed on the label.    Follow-up care  Follow up with your healthcare provider or our staff as directed.  When to seek medical advice  Call your healthcare provider for any of the following:  · Pain changes in pattern  · Pain doesn't lessen or gets worse  · New symptoms appear  · Fever of 100.4ºF (38ºC) or higher, or as directed by your healthcare provider  Date Last Reviewed: 7/26/2015  © 0419-6796 [employer]. [address] [place] [address], [place] [address]. All rights reserved. This information is not intended as a substitute for professional medical care. Always follow your healthcare professional's instructions.

## 2018-08-16 NOTE — PROGRESS NOTES
Subjective:       Patient ID: Liane Valiente is a 27 y.o. female.    Chief Complaint:  Ovarian Cyst      History of Present Illness  HPI  Presents to follow-up ovarian cyst noted on pelvic ultrasound last month.  Patient states the ultrasound was ordered to evaluate some abdominal pain she has been experiencing.  Reports the pain is mainly in the left side of her abdomen and pelvis, and radiates to the left flank and left hip.  Worse with sex.  Has a Paragard IUD in place.  Pelvic ultrasound last month was done, and shows Paragard in the correct position.  It also showed a 2.1 cm complex left ovarian cyst.  Patient is still having pain.  No dysuria or hematuria, but feels like she has to stay well-hydrated or she will have urinary urgency but not be able to void.    GYN & OB History  Patient's last menstrual period was 2018 (lmp unknown).   Date of Last Pap: 2016    OB History    Para Term  AB Living   3 3 3     3   SAB TAB Ectopic Multiple Live Births         0 3      # Outcome Date GA Lbr Robert/2nd Weight Sex Delivery Anes PTL Lv   3 Term 17 40w6d   F    NATHANIEL   2 Term 01/19/15 38w2d  3.35 kg (7 lb 6.2 oz) M Vag-Spont  N NATHANIEL      Complications: Nuchal cord   1 Term 13 39w6d  3.147 kg (6 lb 15 oz) F Vag-Spont   NATHANIEL          Review of Systems  Review of Systems   Constitutional: Negative for fatigue, fever and unexpected weight change.   Gastrointestinal: Negative for abdominal pain, constipation, diarrhea, nausea and vomiting.   Genitourinary: Positive for dyspareunia, flank pain (left) and pelvic pain. Negative for dysuria, frequency, menorrhagia, menstrual problem, urgency, vaginal bleeding, vaginal discharge, vaginal pain, dysmenorrhea, postcoital bleeding and vaginal odor.           Objective:    Physical Exam:   Constitutional: She is oriented to person, place, and time. She appears well-developed and well-nourished. No distress.             Abdominal: Soft. She  exhibits no distension and no mass. There is tenderness (mild:  suprapubic, LLQ, and left mid-abdomen). There is no rebound and no guarding. Hernia confirmed negative in the right inguinal area and confirmed negative in the left inguinal area.     Genitourinary: Pelvic exam was performed with patient supine. There is no rash, tenderness, lesion or injury on the right labia. There is no rash, tenderness, lesion or injury on the left labia. Uterus is not deviated, not enlarged, not fixed, not tender and not experiencing uterine prolapse. Right adnexum displays no mass, no tenderness and no fullness. Left adnexum displays tenderness. Left adnexum displays no mass and no fullness. There is bleeding (menstrual blood present) in the vagina. No erythema, tenderness, rectocele, cystocele or unspecified prolapse of vaginal walls in the vagina. No foreign body in the vagina. No signs of injury around the vagina. No vaginal discharge found. Cervix exhibits no motion tenderness, no discharge and no friability. Additional cervical findings: IUD strings visualized       Uterus Size: 6 cm       Neurological: She is alert and oriented to person, place, and time.     Psychiatric: She has a normal mood and affect.          Assessment:        1. Complex cyst of left ovary    2. Left lower quadrant pain                Plan:      Liane was seen today for ovarian cyst.    Diagnoses and all orders for this visit:    Complex cyst of left ovary  -     US Pelvis Complete Non OB; Future    Left lower quadrant pain  -     Urinalysis  -     Urine culture    Left ovarian cyst was very small.  Will re-image with ultrasound, and check UA with culture to rule out UTI.  If above work-up is unremarkable, will plan CT abd/pelvis.

## 2018-08-18 LAB — BACTERIA UR CULT: NORMAL

## 2018-08-23 ENCOUNTER — TELEPHONE (OUTPATIENT)
Dept: RADIOLOGY | Facility: HOSPITAL | Age: 28
End: 2018-08-23

## 2018-11-07 ENCOUNTER — TELEPHONE (OUTPATIENT)
Dept: OBSTETRICS AND GYNECOLOGY | Facility: CLINIC | Age: 28
End: 2018-11-07

## 2018-11-07 NOTE — TELEPHONE ENCOUNTER
Attempted to contact patient. No answer. Left message for patient to return call to clinic. Called patient from [facility] to schedule pelvis ultrasound.

## 2019-06-26 ENCOUNTER — TELEPHONE (OUTPATIENT)
Dept: OBSTETRICS AND GYNECOLOGY | Facility: CLINIC | Age: 29
End: 2019-06-26

## 2019-06-26 NOTE — TELEPHONE ENCOUNTER
----- Message from Lona Rios sent at 6/25/2019  5:51 PM CDT -----  Contact: [facility]  Appointment Request From: Liane Valiente    With Provider: Sintia Barajas CNM [[facility] - Smoking Cessation]    Preferred Date Range: 6/25/2019 - 7/31/2019    Preferred Times: Any time    Reason for visit: Existing Patient    Comments:  Annual check up

## 2021-02-10 NOTE — SUBJECTIVE & OBJECTIVE
Interval History:  Liane is a 26 y.o.  at 40w6d. She is doing well, comfortable after epidural.     Objective:     Vital Signs (Most Recent):  Temp: 98 °F (36.7 °C) (17 1521)  Pulse: 88 (17 1405)  Resp: 18 (17 0900)  BP: (!) 136/92 (17 1402)  SpO2: 100 % (17 1405) Vital Signs (24h Range):  Temp:  [98 °F (36.7 °C)-98.2 °F (36.8 °C)] 98 °F (36.7 °C)  Pulse:  [71-99] 88  Resp:  [17-18] 18  SpO2:  [99 %-100 %] 100 %  BP: (107-140)/(56-92) 136/92     Weight: 99.8 kg (220 lb)  Body mass index is 37.76 kg/m².    FHT: 130 Cat 2 (reassuring)  TOCO: Q 2-3 minutes    Cervical Exam:  Dilation:  8  Effacement:  80 %  Station: -2  Presentation: Vertex     Significant Labs:  Lab Results   Component Value Date    GROUPTRH A POS 2017    HEPBSAG Negative 2016    STREPBCULT No Group B Streptococcus isolated 2017       I have personallly reviewed all pertinent lab results from the last 24 hours.    Physical Exam:   Constitutional: No distress.               Genitourinary: Vagina normal.                    58

## 2022-12-15 NOTE — PROGRESS NOTES
[facility] -   Obstetrics  Postpartum Progress Note    Patient Name: Liane Valiente  MRN: [id number]  Admission Date: 2017  Hospital Length of Stay: 1 days  Attending Physician: Dede Powell MD  Primary Care Provider: Mandi England MD    Subjective:     Principal Problem: (normal spontaneous vaginal delivery)    Hospital course: Admit for spontaneous active labor  NCB  Normal PP course    Patient is doing well this morning. Ambulating and voiding without difficulty. Lochia is small without clots. Mild abdominal cramping relieved with Motrin. Patient is breastfeeding. Using NFP for contraception. She desires circumcision for her male baby: not applicable.    Objective:     Vital Signs (Most Recent):  Temp: 97.9 °F (36.6 °C) (17 0810)  Pulse: 77 (17 0810)  Resp: 18 (17 0810)  BP: 116/67 (17 0810)  SpO2: 98 % (17 2239) Vital Signs (24h Range):  Temp:  [97.9 °F (36.6 °C)-99.9 °F (37.7 °C)] 97.9 °F (36.6 °C)  Pulse:  [] 77  Resp:  [18] 18  SpO2:  [88 %-100 %] 98 %  BP: ()/() 116/67     Weight: 99.8 kg (220 lb)  Body mass index is 37.76 kg/m².      Intake/Output Summary (Last 24 hours) at 17 0945  Last data filed at 17 0400   Gross per 24 hour   Intake                0 ml   Output             2050 ml   Net            -2050 ml       Significant Labs:  Lab Results   Component Value Date    GROUPTRH A POS 2017    HEPBSAG Negative 2016    STREPBCULT No Group B Streptococcus isolated 2017       Recent Labs  Lab 17  1138   HGB 13.3   HCT 38.2       I have personallly reviewed all pertinent lab results from the last 24 hours.    Physical Exam:   Constitutional: She is oriented to person, place, and time. She appears well-developed and well-nourished. No distress.        Pulmonary/Chest: Effort normal.        Abdominal: Soft. There is no tenderness.   Fundus firm at U-1                 Neurological: She is alert  and oriented to person, place, and time.     Psychiatric: She has a normal mood and affect.       Assessment/Plan:     26 y.o. female  for:    :  Routine postpartum care  Consult lactation prn    Disposition: As patient meets milestones, will plan to discharge tomorrow.    Erika Charles CNM  Obstetrics  [facility]     O-T Plasty Text: The defect edges were debeveled with a #15 scalpel blade.  Given the location of the defect, shape of the defect and the proximity to free margins an O-T plasty was deemed most appropriate.  Using a sterile surgical marker, an appropriate O-T plasty was drawn incorporating the defect and placing the expected incisions within the relaxed skin tension lines where possible.    The area thus outlined was incised deep to adipose tissue with a #15 scalpel blade.  The skin margins were undermined to an appropriate distance in all directions utilizing iris scissors.